# Patient Record
Sex: FEMALE | Race: WHITE | Employment: FULL TIME | URBAN - METROPOLITAN AREA
[De-identification: names, ages, dates, MRNs, and addresses within clinical notes are randomized per-mention and may not be internally consistent; named-entity substitution may affect disease eponyms.]

---

## 2017-01-09 ENCOUNTER — OFFICE VISIT (OUTPATIENT)
Dept: FAMILY MEDICINE CLINIC | Facility: CLINIC | Age: 22
End: 2017-01-09

## 2017-01-09 VITALS
RESPIRATION RATE: 18 BRPM | OXYGEN SATURATION: 99 % | HEART RATE: 128 BPM | BODY MASS INDEX: 49 KG/M2 | WEIGHT: 293 LBS | TEMPERATURE: 99 F | SYSTOLIC BLOOD PRESSURE: 130 MMHG | DIASTOLIC BLOOD PRESSURE: 88 MMHG

## 2017-01-09 DIAGNOSIS — J20.9 BRONCHOSPASM WITH BRONCHITIS, ACUTE: Primary | ICD-10-CM

## 2017-01-09 PROCEDURE — 94640 AIRWAY INHALATION TREATMENT: CPT | Performed by: PHYSICIAN ASSISTANT

## 2017-01-09 PROCEDURE — 99213 OFFICE O/P EST LOW 20 MIN: CPT | Performed by: PHYSICIAN ASSISTANT

## 2017-01-09 RX ORDER — BENZONATATE 200 MG/1
200 CAPSULE ORAL 3 TIMES DAILY PRN
Qty: 30 CAPSULE | Refills: 0 | Status: SHIPPED | OUTPATIENT
Start: 2017-01-09 | End: 2017-02-06 | Stop reason: ALTCHOICE

## 2017-01-09 RX ORDER — PREDNISONE 10 MG/1
50 TABLET ORAL DAILY
Qty: 25 TABLET | Refills: 0 | Status: SHIPPED | OUTPATIENT
Start: 2017-01-09 | End: 2017-01-19

## 2017-01-09 RX ORDER — MEDROXYPROGESTERONE ACETATE 150 MG/ML
150 INJECTION, SUSPENSION INTRAMUSCULAR
COMMUNITY
Start: 2016-02-19 | End: 2016-05-19

## 2017-01-09 RX ORDER — AZITHROMYCIN 250 MG/1
TABLET, FILM COATED ORAL
Qty: 6 TABLET | Refills: 0 | Status: SHIPPED | OUTPATIENT
Start: 2017-01-09 | End: 2017-01-14

## 2017-01-09 RX ORDER — ALBUTEROL SULFATE 90 UG/1
2 AEROSOL, METERED RESPIRATORY (INHALATION) EVERY 4 HOURS PRN
Qty: 1 INHALER | Refills: 0 | Status: SHIPPED | OUTPATIENT
Start: 2017-01-09 | End: 2017-12-22 | Stop reason: ALTCHOICE

## 2017-01-09 RX ORDER — IPRATROPIUM BROMIDE AND ALBUTEROL SULFATE 2.5; .5 MG/3ML; MG/3ML
3 SOLUTION RESPIRATORY (INHALATION) ONCE
Status: COMPLETED | OUTPATIENT
Start: 2017-01-09 | End: 2017-01-09

## 2017-01-09 RX ADMIN — IPRATROPIUM BROMIDE AND ALBUTEROL SULFATE 3 ML: 2.5; .5 SOLUTION RESPIRATORY (INHALATION) at 17:39:00

## 2017-01-09 NOTE — PATIENT INSTRUCTIONS
1. Duoneb today in office. 2.  Zithromax Z-Fred as prescribed. You will take this for 5 days and it stays in the system for 10 days. 3.  Prednisone 10 m tablets (50mg) once daily for 5 days.    4.  Albuterol inhaler:  2 puffs inhaled at least 3 ti

## 2017-01-09 NOTE — PROGRESS NOTES
CHIEF COMPLAINT:   Patient presents with:  Cough/URI: cough x 2 weeks. Alternating NP and productive with foul tasting sputum, sore throat from coughing, R ear pain, chest tightness and SOB/GARDINER.           HPI:   Diclia Kimball is a 19year old female who p double vision. HENT: Reports mild sinus congestion. CARDIOVASCULAR: Denies chest pain or palpitations  LUNGS: Per HPI. GI: Denies N/V/C/D or abdominal pain.       EXAM:   /88 mmHg  Pulse 128  Temp(Src) 98.5 °F (36.9 °C) (Oral)  Resp 18  Wt 316 lb hours as needed for Wheezing or Shortness of Breath. benzonatate 200 MG Oral Cap 30 capsule 0      Sig: Take 1 capsule (200 mg total) by mouth 3 (three) times daily as needed for cough.            Side effects, risks, benefits, of medication explained

## 2017-01-30 ENCOUNTER — OFFICE VISIT (OUTPATIENT)
Dept: FAMILY MEDICINE CLINIC | Facility: CLINIC | Age: 22
End: 2017-01-30

## 2017-01-30 VITALS
TEMPERATURE: 98 F | HEART RATE: 98 BPM | DIASTOLIC BLOOD PRESSURE: 82 MMHG | OXYGEN SATURATION: 99 % | SYSTOLIC BLOOD PRESSURE: 146 MMHG | RESPIRATION RATE: 18 BRPM

## 2017-01-30 DIAGNOSIS — Z23 NEED FOR VACCINATION: ICD-10-CM

## 2017-01-30 DIAGNOSIS — R03.0 ELEVATED BLOOD-PRESSURE READING WITHOUT DIAGNOSIS OF HYPERTENSION: ICD-10-CM

## 2017-01-30 DIAGNOSIS — R05.9 COUGH: Primary | ICD-10-CM

## 2017-01-30 PROCEDURE — 90471 IMMUNIZATION ADMIN: CPT | Performed by: PHYSICIAN ASSISTANT

## 2017-01-30 PROCEDURE — 90715 TDAP VACCINE 7 YRS/> IM: CPT | Performed by: PHYSICIAN ASSISTANT

## 2017-01-30 PROCEDURE — 99213 OFFICE O/P EST LOW 20 MIN: CPT | Performed by: PHYSICIAN ASSISTANT

## 2017-01-30 RX ORDER — FLUTICASONE PROPIONATE 50 MCG
2 SPRAY, SUSPENSION (ML) NASAL DAILY
Qty: 1 BOTTLE | Refills: 0 | Status: SHIPPED | OUTPATIENT
Start: 2017-01-30 | End: 2017-02-06

## 2017-01-30 RX ORDER — BENZONATATE 200 MG/1
200 CAPSULE ORAL 3 TIMES DAILY PRN
Qty: 30 CAPSULE | Refills: 0 | Status: SHIPPED | OUTPATIENT
Start: 2017-01-30 | End: 2017-02-06

## 2017-01-30 NOTE — PROGRESS NOTES
CHIEF COMPLAINT:   Patient presents with:  Cough      HPI:   Dilcia Kimball is a 19year old female who presents for persistent cough x 2+ weeks.   Reports continues to have predominately NP cough, post nasal drainage and episodic SOB while at rest.  Denies Smoking Status: Never Smoker                      Smokeless Status: Never Used                        Alcohol Use: Yes           0.0 oz/week       0 Standard drinks or equivalent per week       Comment: occasionally        REVIEW OF SYSTEMS:   GENERAL: n 1.  ?Cough--post viral cough vs secondary to post nasal drainage or Asthma variant. Coupon provided for albuterol respiclick. Flonase per epic for management of post nasal drainage, advised close f/u with PCP for recheck and further evaluation for ICS. Everyone has had a cough as part of the common cold, flu, or bronchitis. This kind of cough occurs along with an achy feeling, low-grade fever, nasal and sinus congestion, and a scratchy or sore throat. This usually gets better in 2 to 3 weeks.  A cough lane Cough may be the only sign of mild asthma. You may have tests to find out if asthma is causing your cough. You may also take asthma medicine on a trial basis.   Acid reflux (heartburn, GERD)  The esophagus is the tube that carries food from the mouth to the © 9179-1484 The [address] [place] [address], [address] [place]. All rights reserved. This information is not intended as a substitute for professional medical care. Always follow your healthcare professional's instructions.             The

## 2017-01-30 NOTE — PATIENT INSTRUCTIONS
1.  Tessalon perles as prescribed. 2.  Flonase as prescribed. 2 sprays in each nostril daily, or 1 spray in each nostril twice daily.   If you develop a nosebleed, stop medication and restart at half the dose (1 spray in each nostril daily) after you ha A cough that is worse at night may be due to postnasal drip. Excess mucus in the nose drains from the back of your nose to your throat. This triggers the cough reflex. Postnasal drip may be due to a sinus infection or allergy.  Common allergens include dust The esophagus is the tube that carries food from the mouth to the stomach. A valve at its lower end prevents stomach acids from flowing upward. If this valve does not work properly, acid from the stomach enters the esophagus.  This may cause a burning pain

## 2017-02-02 NOTE — TELEPHONE ENCOUNTER
Patient notified and verbalized understanding.     Future Appointments  Date Time Provider Fahad Thomason   2/6/2017 1:00 PM Simin Patel [facility] ANTONY Fong

## 2017-02-06 ENCOUNTER — OFFICE VISIT (OUTPATIENT)
Dept: FAMILY MEDICINE CLINIC | Facility: CLINIC | Age: 22
End: 2017-02-06

## 2017-02-06 VITALS
OXYGEN SATURATION: 98 % | BODY MASS INDEX: 45.99 KG/M2 | RESPIRATION RATE: 16 BRPM | HEIGHT: 67 IN | DIASTOLIC BLOOD PRESSURE: 84 MMHG | TEMPERATURE: 98 F | WEIGHT: 293 LBS | HEART RATE: 90 BPM | SYSTOLIC BLOOD PRESSURE: 124 MMHG

## 2017-02-06 DIAGNOSIS — Z00.00 PREVENTATIVE HEALTH CARE: Primary | ICD-10-CM

## 2017-02-06 DIAGNOSIS — Z00.00 HEALTHY ADULT ON ROUTINE PHYSICAL EXAMINATION: ICD-10-CM

## 2017-02-06 DIAGNOSIS — E66.01 MORBID OBESITY DUE TO EXCESS CALORIES (HCC): ICD-10-CM

## 2017-02-06 DIAGNOSIS — R05.9 COUGH: ICD-10-CM

## 2017-02-06 DIAGNOSIS — E78.00 PURE HYPERCHOLESTEROLEMIA: ICD-10-CM

## 2017-02-06 LAB
BASOPHILS # BLD AUTO: 0.04 X10(3) UL (ref 0–0.1)
BASOPHILS NFR BLD AUTO: 0.6 %
EOSINOPHIL # BLD AUTO: 0.19 X10(3) UL (ref 0–0.3)
EOSINOPHIL NFR BLD AUTO: 2.7 %
ERYTHROCYTE [DISTWIDTH] IN BLOOD BY AUTOMATED COUNT: 13.5 % (ref 11.5–16)
HCT VFR BLD AUTO: 41 % (ref 34–50)
HGB BLD-MCNC: 13.9 G/DL (ref 12–16)
IMMATURE GRANULOCYTE COUNT: 0.01 X10(3) UL (ref 0–1)
IMMATURE GRANULOCYTE RATIO %: 0.1 %
LYMPHOCYTES # BLD AUTO: 2.81 X10(3) UL (ref 0.9–4)
LYMPHOCYTES NFR BLD AUTO: 39.9 %
MCH RBC QN AUTO: 28 PG (ref 27–33.2)
MCHC RBC AUTO-ENTMCNC: 33.9 G/DL (ref 31–37)
MCV RBC AUTO: 82.5 FL (ref 81–100)
MONOCYTES # BLD AUTO: 0.58 X10(3) UL (ref 0.1–0.6)
MONOCYTES NFR BLD AUTO: 8.2 %
NEUTROPHIL ABS PRELIM: 3.41 X10 (3) UL (ref 1.3–6.7)
NEUTROPHILS # BLD AUTO: 3.41 X10(3) UL (ref 1.3–6.7)
NEUTROPHILS NFR BLD AUTO: 48.5 %
PLATELET # BLD AUTO: 279 10(3)UL (ref 150–450)
RBC # BLD AUTO: 4.97 X10(6)UL (ref 3.8–5.1)
RED CELL DISTRIBUTION WIDTH-SD: 40.5 FL (ref 35.1–46.3)
WBC # BLD AUTO: 7 X10(3) UL (ref 4–13)

## 2017-02-06 PROCEDURE — 80053 COMPREHEN METABOLIC PANEL: CPT | Performed by: FAMILY MEDICINE

## 2017-02-06 PROCEDURE — 85025 COMPLETE CBC W/AUTO DIFF WBC: CPT | Performed by: FAMILY MEDICINE

## 2017-02-06 PROCEDURE — 99395 PREV VISIT EST AGE 18-39: CPT | Performed by: FAMILY MEDICINE

## 2017-02-06 PROCEDURE — 36415 COLL VENOUS BLD VENIPUNCTURE: CPT | Performed by: FAMILY MEDICINE

## 2017-02-06 PROCEDURE — 80061 LIPID PANEL: CPT | Performed by: FAMILY MEDICINE

## 2017-02-06 RX ORDER — FLUTICASONE PROPIONATE 50 MCG
2 SPRAY, SUSPENSION (ML) NASAL DAILY
Qty: 1 BOTTLE | Refills: 0 | Status: SHIPPED | OUTPATIENT
Start: 2017-02-06 | End: 2017-12-22 | Stop reason: ALTCHOICE

## 2017-02-06 RX ORDER — BENZONATATE 200 MG/1
200 CAPSULE ORAL 3 TIMES DAILY PRN
Qty: 30 CAPSULE | Refills: 0 | Status: SHIPPED | OUTPATIENT
Start: 2017-02-06 | End: 2017-12-22 | Stop reason: ALTCHOICE

## 2017-02-06 NOTE — PROGRESS NOTES
HPI:   Manjinder Gibson is a 19year old female who presents for a complete physical exam. Symptoms: not having periods. Patient complains of nothing today. Recovering from bronchitis. Started on inhalers in [facility], but could not afford them.  Cough is getti MCG/ACT Inhalation Aero Soln Inhale 2 puffs into the lungs every 4 (four) hours as needed for Wheezing or Shortness of Breath.  Disp: 1 Inhaler Rfl: 0   benzonatate 200 MG Oral Cap Take 1 capsule (200 mg total) by mouth 3 (three) times daily as needed for c throat are clear  EYES:PERRLA, EOMI, conjunctiva are clear  NECK: supple,no adenopathy   CHEST: no chest tenderness  BREAST: no dominant or suspicious mass  LUNGS: clear to auscultation  CARDIO: RRR without murmur  GI: good BS's,no masses, HSM or tendernes

## 2017-02-07 ENCOUNTER — TELEPHONE (OUTPATIENT)
Dept: FAMILY MEDICINE CLINIC | Facility: CLINIC | Age: 22
End: 2017-02-07

## 2017-02-07 DIAGNOSIS — E78.00 PURE HYPERCHOLESTEROLEMIA: Primary | ICD-10-CM

## 2017-02-07 LAB
ALBUMIN SERPL-MCNC: 4 G/DL (ref 3.5–4.8)
ALP LIVER SERPL-CCNC: 74 U/L (ref 52–144)
ALT SERPL-CCNC: 27 U/L (ref 14–54)
AST SERPL-CCNC: 17 U/L (ref 15–41)
BILIRUB SERPL-MCNC: 0.3 MG/DL (ref 0.1–2)
BUN BLD-MCNC: 16 MG/DL (ref 8–20)
CALCIUM BLD-MCNC: 9.2 MG/DL (ref 8.3–10.3)
CHLORIDE: 105 MMOL/L (ref 101–111)
CHOLEST SMN-MCNC: 257 MG/DL (ref ?–190)
CO2: 27 MMOL/L (ref 22–32)
CREAT BLD-MCNC: 0.91 MG/DL (ref 0.55–1.02)
GLUCOSE BLD-MCNC: 51 MG/DL (ref 70–99)
HDLC SERPL-MCNC: 50 MG/DL (ref 45–?)
HDLC SERPL: 5.14 {RATIO} (ref ?–4.44)
LDLC SERPL CALC-MCNC: 189 MG/DL (ref ?–120)
M PROTEIN MFR SERPL ELPH: 8 G/DL (ref 6.1–8.3)
NONHDLC SERPL-MCNC: 207 MG/DL (ref ?–150)
POTASSIUM SERPL-SCNC: 3.8 MMOL/L (ref 3.6–5.1)
SODIUM SERPL-SCNC: 140 MMOL/L (ref 136–144)
TRIGLYCERIDES: 92 MG/DL (ref ?–115)
VLDL: 18 MG/DL (ref 5–40)

## 2017-02-07 RX ORDER — ATORVASTATIN CALCIUM 10 MG/1
10 TABLET, FILM COATED ORAL NIGHTLY
Qty: 90 TABLET | Refills: 0 | Status: SHIPPED | OUTPATIENT
Start: 2017-02-07 | End: 2017-12-22 | Stop reason: ALTCHOICE

## 2017-02-07 NOTE — TELEPHONE ENCOUNTER
D/w pt results of recent blood work. Started a cholesterol medication today. Script sent. Discussed possible side effects. Please place recall for lipids in 3-4 months.

## 2017-03-17 DIAGNOSIS — R05.9 COUGH: Primary | ICD-10-CM

## 2017-03-17 NOTE — TELEPHONE ENCOUNTER
Script printed. I'd like her to come in for a wt check, when she pick it up, please. Check wt, bp, pulse for RN visit. Thanks.

## 2017-03-17 NOTE — TELEPHONE ENCOUNTER
Patient states her schedule is crazy busy and she is not sure if she can get to the office for a nurse visit. Patient states she was told she did not need to come back for 2 months and wants to know if ok to wait.

## 2017-03-17 NOTE — TELEPHONE ENCOUNTER
Patient notified and verbalized understanding.   States she will call back to schedule appt with Dr Sindhu Garcia prior to next refill

## 2017-03-17 NOTE — TELEPHONE ENCOUNTER
Last OV 2/6/17  Last refilled: 2/6/17 Phentermine #30  0 refills         2/7/17 Simvastatin  #90  0 refills,  should not need refill

## 2017-03-29 ENCOUNTER — OFFICE VISIT (OUTPATIENT)
Dept: FAMILY MEDICINE CLINIC | Facility: CLINIC | Age: 22
End: 2017-03-29

## 2017-03-29 VITALS
TEMPERATURE: 98 F | OXYGEN SATURATION: 99 % | SYSTOLIC BLOOD PRESSURE: 140 MMHG | RESPIRATION RATE: 16 BRPM | HEART RATE: 80 BPM | DIASTOLIC BLOOD PRESSURE: 70 MMHG

## 2017-03-29 DIAGNOSIS — J01.20 ACUTE ETHMOIDAL SINUSITIS, RECURRENCE NOT SPECIFIED: Primary | ICD-10-CM

## 2017-03-29 DIAGNOSIS — M26.609 TMJ (TEMPOROMANDIBULAR JOINT DISORDER): ICD-10-CM

## 2017-03-29 DIAGNOSIS — R03.0 ELEVATED BLOOD PRESSURE READING WITHOUT DIAGNOSIS OF HYPERTENSION: ICD-10-CM

## 2017-03-29 DIAGNOSIS — E66.01 MORBID OBESITY DUE TO EXCESS CALORIES (HCC): ICD-10-CM

## 2017-03-29 DIAGNOSIS — H92.03 OTALGIA, BILATERAL: ICD-10-CM

## 2017-03-29 DIAGNOSIS — J30.9 ALLERGIC RHINITIS, UNSPECIFIED ALLERGIC RHINITIS TRIGGER, UNSPECIFIED RHINITIS SEASONALITY: ICD-10-CM

## 2017-03-29 PROCEDURE — 99213 OFFICE O/P EST LOW 20 MIN: CPT | Performed by: PHYSICIAN ASSISTANT

## 2017-03-29 RX ORDER — FLUTICASONE PROPIONATE 50 MCG
2 SPRAY, SUSPENSION (ML) NASAL DAILY
Qty: 1 BOTTLE | Refills: 0 | Status: SHIPPED | OUTPATIENT
Start: 2017-03-29 | End: 2017-12-22 | Stop reason: ALTCHOICE

## 2017-03-29 RX ORDER — AMOXICILLIN AND CLAVULANATE POTASSIUM 875; 125 MG/1; MG/1
1 TABLET, FILM COATED ORAL 2 TIMES DAILY
Qty: 20 TABLET | Refills: 0 | Status: SHIPPED | OUTPATIENT
Start: 2017-03-29 | End: 2017-04-08

## 2017-03-29 NOTE — PATIENT INSTRUCTIONS
1. Augmentin 875mg twice daily for 10 days. Recommend probiotics while on this medication to prevent antibiotics associated diarrhea or yeast infections.   Examples include yogurt with active live cultures; or in capsule/granule forms such as Florastor, C Applying heat to the area surrounding your sinuses may make you feel more comfortable. Use a hot water bottle or a hand towel dipped in hot water. Some people also find ice packs effective for relieving pain.   Medicines  Your doctor may prescribe medicatio If you answer “yes” to any of the questions above, you need to take action. Adjusting your posture or taking a short break can help prevent or relieve TMD symptoms. Listening to your body  Many people get used to ignoring pain.  But pain is a signal that y © 9685-8167 [address], [address] [place]. All rights reserved. This information is not intended as a substitute for professional medical care. Always follow your healthcare professional's instructions.

## 2017-03-29 NOTE — PROGRESS NOTES
CHIEF COMPLAINT:   Patient presents with:  Ear Pain      HPI:   Hemant Garcia is a 19year old female who presents to clinic today with complaints of left ear pain. Has had for 1  days. Pain is described as deep/aching.   Seen by chiropractor who also ev Diagnosis Date   • Obesity    • Depression    • Anxiety    • Calculus of kidney       Social History:    Smoking Status: Never Smoker                      Smokeless Status: Never Used                        Alcohol Use: Yes           0.0 oz/week       0 St 1 . Augmentin per epic for suspected ethmoid sinusitis. Advised pt suspect underlying sinusitis. Recommend daily antihistamine and Flonase as directed, given failure of pharmacy to fill previous Rx via escript, will issue written rx for Flonase.   Use and 1. Augmentin 875mg twice daily for 10 days. Recommend probiotics while on this medication to prevent antibiotics associated diarrhea or yeast infections.   Examples include yogurt with active live cultures; or in capsule/granule forms such as Florastor, C Applying heat to the area surrounding your sinuses may make you feel more comfortable. Use a hot water bottle or a hand towel dipped in hot water. Some people also find ice packs effective for relieving pain.   Medicines  Your doctor may prescribe medicatio If you answer “yes” to any of the questions above, you need to take action. Adjusting your posture or taking a short break can help prevent or relieve TMD symptoms. Listening to your body  Many people get used to ignoring pain.  But pain is a signal that y © 4563-6044 [address], [address] [place]. All rights reserved. This information is not intended as a substitute for professional medical care. Always follow your healthcare professional's instructions.

## 2017-05-01 ENCOUNTER — TELEPHONE (OUTPATIENT)
Dept: FAMILY MEDICINE CLINIC | Facility: CLINIC | Age: 22
End: 2017-05-01

## 2017-05-01 NOTE — TELEPHONE ENCOUNTER
Patient states she is changing a super tampon about every hour. Is leaking through in between. States she is on her third change since 7:30. Period started Saturday as spotting, Sunday was her normal heavy flow but today it is worse. No dizziness.

## 2017-05-01 NOTE — TELEPHONE ENCOUNTER
It can be normal to have changes in periods or heavier periods after stopping the depo shots. If the bleeding does not slow down in the next 24 hours then we may need to restart some hormones to get you under control. Let me know.  Otherwise, if you are fee

## 2017-05-02 ENCOUNTER — TELEPHONE (OUTPATIENT)
Dept: FAMILY MEDICINE CLINIC | Facility: CLINIC | Age: 22
End: 2017-05-02

## 2017-05-08 ENCOUNTER — PATIENT OUTREACH (OUTPATIENT)
Dept: FAMILY MEDICINE CLINIC | Facility: CLINIC | Age: 22
End: 2017-05-08

## 2017-06-01 ENCOUNTER — OFFICE VISIT (OUTPATIENT)
Dept: FAMILY MEDICINE CLINIC | Facility: CLINIC | Age: 22
End: 2017-06-01

## 2017-06-01 VITALS
BODY MASS INDEX: 50 KG/M2 | WEIGHT: 293 LBS | RESPIRATION RATE: 12 BRPM | TEMPERATURE: 99 F | HEART RATE: 100 BPM | DIASTOLIC BLOOD PRESSURE: 78 MMHG | SYSTOLIC BLOOD PRESSURE: 112 MMHG

## 2017-06-01 DIAGNOSIS — Z31.69 PRE-CONCEPTION COUNSELING: Primary | ICD-10-CM

## 2017-06-01 DIAGNOSIS — Z91.09 ENVIRONMENTAL ALLERGIES: ICD-10-CM

## 2017-06-01 DIAGNOSIS — L21.9 SEBORRHEIC DERMATITIS OF SCALP: ICD-10-CM

## 2017-06-01 DIAGNOSIS — R73.03 PREDIABETES: ICD-10-CM

## 2017-06-01 DIAGNOSIS — R23.8 DRY SCALP: ICD-10-CM

## 2017-06-01 PROCEDURE — 99214 OFFICE O/P EST MOD 30 MIN: CPT | Performed by: FAMILY MEDICINE

## 2017-06-01 RX ORDER — KETOCONAZOLE 20 MG/ML
SHAMPOO TOPICAL
Qty: 1 BOTTLE | Refills: 0 | Status: SHIPPED | OUTPATIENT
Start: 2017-06-01 | End: 2017-12-22 | Stop reason: ALTCHOICE

## 2017-06-01 RX ORDER — METFORMIN HYDROCHLORIDE 500 MG/1
500 TABLET, EXTENDED RELEASE ORAL DAILY
Qty: 30 TABLET | Refills: 0 | Status: SHIPPED | OUTPATIENT
Start: 2017-06-01 | End: 2017-08-09

## 2017-06-01 NOTE — PROGRESS NOTES
Yesenia Wells is a 19year old female.   Patient presents with:  Fertility: per pt, infertility issues; has been trying to conceive for a few months  Refill Request: needs refill of flonase; having bad allergies    Wt: was on phentermin for a while, bu Oral Tablet 24 Hr Take 1 tablet (500 mg total) by mouth daily.  Disp: 30 tablet Rfl: 0   Ketoconazole 2 % External Shampoo 1 application twice weekly, leave in for 5 min, then rinse out Disp: 1 Bottle Rfl: 0   Fluticasone Propionate 50 MCG/ACT Nasal Suspens abdominal pain and denies heartburn  NEURO: denies headaches    EXAM:   /78 mmHg  Pulse 100  Temp(Src) 99 °F (37.2 °C) (Temporal)  Resp 12  Wt 321 lb 12.8 oz  LMP 05/27/2017 Body mass index is 50.39 kg/(m^2).       GENERAL: well developed, well nouris

## 2017-06-06 ENCOUNTER — TELEPHONE (OUTPATIENT)
Dept: FAMILY MEDICINE CLINIC | Facility: CLINIC | Age: 22
End: 2017-06-06

## 2017-07-11 ENCOUNTER — PATIENT OUTREACH (OUTPATIENT)
Dept: FAMILY MEDICINE CLINIC | Facility: CLINIC | Age: 22
End: 2017-07-11

## 2017-07-11 NOTE — PROGRESS NOTES
Detailed message left for patient (consent in Media Tab) notifying of overdue labs. Will postpone for one month.

## 2017-08-09 ENCOUNTER — TELEPHONE (OUTPATIENT)
Dept: FAMILY MEDICINE CLINIC | Facility: CLINIC | Age: 22
End: 2017-08-09

## 2017-08-09 DIAGNOSIS — E66.01 MORBID OBESITY DUE TO EXCESS CALORIES (HCC): ICD-10-CM

## 2017-08-09 RX ORDER — METFORMIN HYDROCHLORIDE 500 MG/1
500 TABLET, EXTENDED RELEASE ORAL DAILY
Qty: 30 TABLET | Refills: 0 | Status: SHIPPED | OUTPATIENT
Start: 2017-08-09 | End: 2017-12-22 | Stop reason: ALTCHOICE

## 2017-08-09 NOTE — TELEPHONE ENCOUNTER
Patient's last HgbA1c 1 15 2016 -5.7- Patient thinks that she should have an increase in meds?  Should she have a repeat HgbA1c first ?  Glucose on CMP on 2 6 2017 - 51

## 2017-08-09 NOTE — TELEPHONE ENCOUNTER
Metformin refilled as is. Have her schedule a f/u appt wth Dr. Jaylon Villafana in a month or so to discuss if the med dose needs to be increased.   Thanks

## 2017-08-09 NOTE — TELEPHONE ENCOUNTER
PT REQUESTING REFILL OFMetFORMIN HCl  MG Oral Tablet 24 Hr  PT WAS WONDERING IF SHE SHOULD HAVE AN INCREASE IN MEDS.     IF SCRIPT SENT PLEASE SEND TO  [facility]    LM IN PT DOES NOT ANSWER, SHE IS AT WORK AND CAN NOT ALWAYS ANSWER P

## 2017-08-11 ENCOUNTER — TELEPHONE (OUTPATIENT)
Dept: FAMILY MEDICINE CLINIC | Facility: CLINIC | Age: 22
End: 2017-08-11

## 2017-08-11 NOTE — TELEPHONE ENCOUNTER
Patient is due for lipid. Will mail letter to patients home address. Numerous attempts have been made to contact patient to schedule an appt.

## 2017-12-22 ENCOUNTER — OFFICE VISIT (OUTPATIENT)
Dept: FAMILY MEDICINE CLINIC | Facility: CLINIC | Age: 22
End: 2017-12-22

## 2017-12-22 VITALS
SYSTOLIC BLOOD PRESSURE: 126 MMHG | DIASTOLIC BLOOD PRESSURE: 76 MMHG | TEMPERATURE: 98 F | RESPIRATION RATE: 14 BRPM | OXYGEN SATURATION: 99 %

## 2017-12-22 DIAGNOSIS — J02.9 SORE THROAT: Primary | ICD-10-CM

## 2017-12-22 DIAGNOSIS — R05.9 COUGH: ICD-10-CM

## 2017-12-22 DIAGNOSIS — R09.81 NASAL CONGESTION: ICD-10-CM

## 2017-12-22 PROCEDURE — 87880 STREP A ASSAY W/OPTIC: CPT | Performed by: FAMILY MEDICINE

## 2017-12-22 PROCEDURE — 99213 OFFICE O/P EST LOW 20 MIN: CPT | Performed by: FAMILY MEDICINE

## 2017-12-22 NOTE — PROGRESS NOTES
HPI:    Patient ID: Kulwant Anand is a 23year old female. Patient presents with:  Cough  Chest Congestion      HPI  Patient is here for cough, sore throat and nasal congestion for 4 days. States cough is productive of greenish sputum.   Has ear pr Ear: Tympanic membrane, external ear and ear canal normal.   Nose: Right sinus exhibits no maxillary sinus tenderness and no frontal sinus tenderness. Left sinus exhibits no maxillary sinus tenderness and no frontal sinus tenderness.    Mouth/Throat: Poster

## 2017-12-26 ENCOUNTER — TELEPHONE (OUTPATIENT)
Dept: FAMILY MEDICINE CLINIC | Facility: CLINIC | Age: 22
End: 2017-12-26

## 2017-12-26 RX ORDER — AMOXICILLIN 875 MG/1
875 TABLET, COATED ORAL 2 TIMES DAILY
Qty: 20 TABLET | Refills: 0 | Status: SHIPPED | OUTPATIENT
Start: 2017-12-26 | End: 2018-01-05

## 2017-12-26 NOTE — TELEPHONE ENCOUNTER
Lab follow up    Spoke with rosanne salguero, specimen is not in lab. Directed to send out ([phone]), specimen is not in the lab and was not received. Will call pt to follow up.     Spoke to pt, she started taking Amoxil 500mg TID that she had left over

## 2017-12-31 ENCOUNTER — NURSE ONLY (OUTPATIENT)
Dept: FAMILY MEDICINE CLINIC | Facility: CLINIC | Age: 22
End: 2017-12-31

## 2017-12-31 VITALS
SYSTOLIC BLOOD PRESSURE: 120 MMHG | HEART RATE: 120 BPM | RESPIRATION RATE: 20 BRPM | DIASTOLIC BLOOD PRESSURE: 60 MMHG | OXYGEN SATURATION: 98 % | TEMPERATURE: 98 F

## 2017-12-31 DIAGNOSIS — H66.91 ACUTE BACTERIAL INFECTION OF RIGHT MIDDLE EAR: Primary | ICD-10-CM

## 2017-12-31 PROCEDURE — 99213 OFFICE O/P EST LOW 20 MIN: CPT | Performed by: NURSE PRACTITIONER

## 2017-12-31 RX ORDER — AMOXICILLIN AND CLAVULANATE POTASSIUM 875; 125 MG/1; MG/1
1 TABLET, FILM COATED ORAL 2 TIMES DAILY
Qty: 20 TABLET | Refills: 0 | Status: SHIPPED | OUTPATIENT
Start: 2017-12-31 | End: 2018-01-10

## 2017-12-31 NOTE — PROGRESS NOTES
CHIEF COMPLAINT:   Patient presents with:  Ear Pain      HPI:   Angela Mccormick is a 23year old female who presents to clinic today with complaints of right ear pain. Has had for 3  days. Pain is described as constant and a clogging sensation.   Cricket NOSE: nostrils patent, yellow nasal mucus, nasal mucosa red and swollen  THROAT: oral mucosa pink, moist. Posterior pharynx not erythematous or injected. No exudates.   NECK: supple, non-tender  LUNGS: clear to auscultation bilaterally, no wheezes or rhonch Sound waves may be disrupted before they reach the inner ear. If this happens, conductive hearing loss may occur. The ear canal can be blocked by wax, infection, a tumor, or a foreign object. The eardrum can be injured or infected.  Abnormal bone growth, in

## 2017-12-31 NOTE — PATIENT INSTRUCTIONS
Stop the Amoxicillin and start the Augmentin      Common Middle Ear Problems    Your middle ear may have been injured or infected recently. Over time, certain growths or bone disease can also harm the middle ear.  Left untreated, middle ear problems often l

## 2018-01-22 ENCOUNTER — OFFICE VISIT (OUTPATIENT)
Dept: FAMILY MEDICINE CLINIC | Facility: CLINIC | Age: 23
End: 2018-01-22

## 2018-01-22 VITALS
HEART RATE: 96 BPM | SYSTOLIC BLOOD PRESSURE: 118 MMHG | RESPIRATION RATE: 16 BRPM | TEMPERATURE: 98 F | HEIGHT: 65 IN | DIASTOLIC BLOOD PRESSURE: 72 MMHG | WEIGHT: 293 LBS | BODY MASS INDEX: 48.82 KG/M2

## 2018-01-22 DIAGNOSIS — B37.3 VAGINAL CANDIDIASIS: Primary | ICD-10-CM

## 2018-01-22 DIAGNOSIS — O99.891 NASAL CONGESTION RELATED TO PREGNANCY: ICD-10-CM

## 2018-01-22 DIAGNOSIS — R09.81 NASAL CONGESTION RELATED TO PREGNANCY: ICD-10-CM

## 2018-01-22 PROCEDURE — 99214 OFFICE O/P EST MOD 30 MIN: CPT | Performed by: FAMILY MEDICINE

## 2018-01-22 RX ORDER — CLOTRIMAZOLE 1 %
1 CREAM WITH APPLICATOR VAGINAL DAILY
Qty: 7 TUBE | Refills: 0 | Status: SHIPPED | OUTPATIENT
Start: 2018-01-22 | End: 2018-01-29

## 2018-01-22 RX ORDER — ONDANSETRON 4 MG/1
TABLET, ORALLY DISINTEGRATING ORAL AS NEEDED
Refills: 0 | COMMUNITY
Start: 2018-01-15 | End: 2018-11-06 | Stop reason: ALTCHOICE

## 2018-01-23 NOTE — PROGRESS NOTES
Chun Arambula is a 23year old female.   Patient presents with:  Vaginal Problem: per pt, possible yeast infection x1 week; seems to be getting worse; has tried OTC monistat; recently on antibiotics  Cough: cough since November      HPI:   Thought she of breath with exertion  CARDIOVASCULAR: denies chest pain on exertion  GI: denies abdominal pain and denies heartburn  : no vaginal bleeding, thinks she feels baby moving, no cramping or abd pain   NEURO: denies headaches    EXAM:   /72   Pulse 96

## 2018-11-06 ENCOUNTER — OFFICE VISIT (OUTPATIENT)
Dept: FAMILY MEDICINE CLINIC | Facility: CLINIC | Age: 23
End: 2018-11-06
Payer: COMMERCIAL

## 2018-11-06 VITALS
HEIGHT: 65 IN | BODY MASS INDEX: 48.82 KG/M2 | SYSTOLIC BLOOD PRESSURE: 124 MMHG | RESPIRATION RATE: 20 BRPM | DIASTOLIC BLOOD PRESSURE: 84 MMHG | HEART RATE: 99 BPM | WEIGHT: 293 LBS | OXYGEN SATURATION: 99 % | TEMPERATURE: 98 F

## 2018-11-06 DIAGNOSIS — M54.50 CHRONIC LEFT-SIDED LOW BACK PAIN WITHOUT SCIATICA: Primary | ICD-10-CM

## 2018-11-06 DIAGNOSIS — F98.8 ATTENTION DEFICIT DISORDER (ADD) WITHOUT HYPERACTIVITY: ICD-10-CM

## 2018-11-06 DIAGNOSIS — G89.29 CHRONIC LEFT-SIDED LOW BACK PAIN WITHOUT SCIATICA: Primary | ICD-10-CM

## 2018-11-06 DIAGNOSIS — R41.840 DIFFICULTY CONCENTRATING: ICD-10-CM

## 2018-11-06 PROCEDURE — 99214 OFFICE O/P EST MOD 30 MIN: CPT | Performed by: FAMILY MEDICINE

## 2018-11-06 RX ORDER — METHYLPREDNISOLONE 4 MG/1
TABLET ORAL
Qty: 1 KIT | Refills: 0 | Status: SHIPPED | OUTPATIENT
Start: 2018-11-06 | End: 2018-11-10

## 2018-11-06 RX ORDER — DEXTROAMPHETAMINE SACCHARATE, AMPHETAMINE ASPARTATE MONOHYDRATE, DEXTROAMPHETAMINE SULFATE AND AMPHETAMINE SULFATE 3.75; 3.75; 3.75; 3.75 MG/1; MG/1; MG/1; MG/1
15 CAPSULE, EXTENDED RELEASE ORAL DAILY
Qty: 30 CAPSULE | Refills: 0 | Status: SHIPPED | OUTPATIENT
Start: 2018-11-06 | End: 2018-12-06
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
## 2018-11-10 ENCOUNTER — OFFICE VISIT (OUTPATIENT)
Dept: FAMILY MEDICINE CLINIC | Facility: CLINIC | Age: 23
End: 2018-11-10
Payer: COMMERCIAL

## 2018-11-10 VITALS
WEIGHT: 293 LBS | SYSTOLIC BLOOD PRESSURE: 122 MMHG | BODY MASS INDEX: 48.82 KG/M2 | TEMPERATURE: 98 F | OXYGEN SATURATION: 98 % | DIASTOLIC BLOOD PRESSURE: 82 MMHG | RESPIRATION RATE: 18 BRPM | HEIGHT: 65 IN

## 2018-11-10 DIAGNOSIS — J40 BRONCHITIS: Primary | ICD-10-CM

## 2018-11-10 PROCEDURE — 99213 OFFICE O/P EST LOW 20 MIN: CPT | Performed by: NURSE PRACTITIONER

## 2018-11-10 RX ORDER — BENZONATATE 200 MG/1
200 CAPSULE ORAL 3 TIMES DAILY PRN
Qty: 30 CAPSULE | Refills: 0 | Status: SHIPPED | OUTPATIENT
Start: 2018-11-10 | End: 2019-01-07

## 2018-11-10 RX ORDER — PREDNISONE 20 MG/1
20 TABLET ORAL DAILY
Qty: 5 TABLET | Refills: 0 | Status: SHIPPED | OUTPATIENT
Start: 2018-11-10 | End: 2018-11-15

## 2018-11-10 NOTE — PROGRESS NOTES
CHIEF COMPLAINT:   Patient presents with:  Cough: x2 weeks, productive, bodyaches, fatigue         HPI:   Houston Menchaca is a 20year old female who presents for cough for  2  weeks. Cough started gradually and is described as horrible.  Patient denies HENT: Atraumatic, normocephalic. TM's clear bilaterally. Nostrils patent, nasal mucosa pink and non-inflamed. No erythema of the throat. NECK: supple, non-tender. LUNGS: Normal respiratory rate. Normal effort. dry cough. no wheezing.  No rales or crac Bronchitis that is caused by a virus is not treated with antibiotics. Instead, medicines may be given to help relieve symptoms. Symptoms can last up to 2 weeks, although the cough may last much longer.   This illness is contagious during the first few days If you are age 72 or older, or if you have a chronic lung disease or condition that affects your immune system, or you smoke, ask your healthcare provider about getting a pneumococcal vaccine and a yearly flu shot (influenza vaccine).   When to seek medical

## 2018-11-10 NOTE — PATIENT INSTRUCTIONS
Use the inhaler at home 2 puffs every 4 hours  Use the medications prescribed  Use Nyquil at bedtime      Viral Bronchitis (Adult)    You have a viral bronchitis. Bronchitis is inflammation and swelling of the lining of the lungs.  This is often caused by a soup). Extra fluids will help loosen secretions in the nose and lungs. · Over-the-counter cough, cold, and sore-throat medicines will not shorten the length of the illness, but they may help to reduce symptoms.  Don't use decongestants if you have high blo

## 2018-11-13 ENCOUNTER — TELEPHONE (OUTPATIENT)
Dept: FAMILY MEDICINE CLINIC | Facility: CLINIC | Age: 23
End: 2018-11-13

## 2018-11-13 DIAGNOSIS — R41.840 DIFFICULTY CONCENTRATING: ICD-10-CM

## 2018-11-13 DIAGNOSIS — F98.8 ATTENTION DEFICIT DISORDER (ADD) WITHOUT HYPERACTIVITY: ICD-10-CM

## 2018-11-13 NOTE — TELEPHONE ENCOUNTER
Spoke with Vanessa Carolina at [employer] and provided clinical information for PA. Approval received  Auth # [id number]  Valid 11/13/18-11/13/21    Jovany at [employer] notified. Per Gabby Dangelo script processed and he will ready for patient.

## 2018-12-10 NOTE — TELEPHONE ENCOUNTER
Patient came back to office with script given earlier today. Script printed has start date of 2/8/19.   Patient returned script   Needs script with current date

## 2018-12-10 NOTE — TELEPHONE ENCOUNTER
Dose increased to 25 mg, but she needs to see me in the office before any more refills, even if we leave the dose the same.

## 2018-12-10 NOTE — TELEPHONE ENCOUNTER
Amphetamine-Dextroamphet ER (ADDERALL XR) 15 MG Oral Capsule SR 24 Hr 30 capsule 0 11/6/2018 12/6/2018   Sig :  Take 1 capsule (15 mg total) by mouth daily.      Route:   Oral       Patient would like to up the dose, Feels like it is wearing off the last 2

## 2019-01-07 ENCOUNTER — OFFICE VISIT (OUTPATIENT)
Dept: FAMILY MEDICINE CLINIC | Facility: CLINIC | Age: 24
End: 2019-01-07
Payer: COMMERCIAL

## 2019-01-07 VITALS
RESPIRATION RATE: 20 BRPM | TEMPERATURE: 98 F | WEIGHT: 293 LBS | HEIGHT: 65 IN | DIASTOLIC BLOOD PRESSURE: 88 MMHG | HEART RATE: 90 BPM | BODY MASS INDEX: 48.82 KG/M2 | SYSTOLIC BLOOD PRESSURE: 126 MMHG

## 2019-01-07 DIAGNOSIS — J40 BRONCHITIS: Primary | ICD-10-CM

## 2019-01-07 DIAGNOSIS — F90.0 ATTENTION DEFICIT HYPERACTIVITY DISORDER (ADHD), PREDOMINANTLY INATTENTIVE TYPE: ICD-10-CM

## 2019-01-07 PROCEDURE — 99214 OFFICE O/P EST MOD 30 MIN: CPT | Performed by: FAMILY MEDICINE

## 2019-01-07 RX ORDER — DEXTROAMPHETAMINE SACCHARATE, AMPHETAMINE ASPARTATE MONOHYDRATE, DEXTROAMPHETAMINE SULFATE AND AMPHETAMINE SULFATE 6.25; 6.25; 6.25; 6.25 MG/1; MG/1; MG/1; MG/1
25 CAPSULE, EXTENDED RELEASE ORAL DAILY
Qty: 30 CAPSULE | Refills: 0 | Status: SHIPPED | OUTPATIENT
Start: 2019-01-07 | End: 2019-02-06

## 2019-01-07 RX ORDER — DEXTROAMPHETAMINE SACCHARATE, AMPHETAMINE ASPARTATE MONOHYDRATE, DEXTROAMPHETAMINE SULFATE AND AMPHETAMINE SULFATE 6.25; 6.25; 6.25; 6.25 MG/1; MG/1; MG/1; MG/1
25 CAPSULE, EXTENDED RELEASE ORAL DAILY
Qty: 30 CAPSULE | Refills: 0 | Status: SHIPPED | OUTPATIENT
Start: 2019-02-06 | End: 2019-03-08

## 2019-01-07 RX ORDER — BENZONATATE 200 MG/1
200 CAPSULE ORAL 3 TIMES DAILY PRN
Qty: 30 CAPSULE | Refills: 0 | Status: SHIPPED | OUTPATIENT
Start: 2019-01-07 | End: 2019-04-03 | Stop reason: ALTCHOICE

## 2019-01-07 RX ORDER — AZITHROMYCIN 250 MG/1
TABLET, FILM COATED ORAL
Qty: 6 TABLET | Refills: 0 | Status: SHIPPED | OUTPATIENT
Start: 2019-01-07 | End: 2019-04-03 | Stop reason: ALTCHOICE

## 2019-01-07 RX ORDER — DEXTROAMPHETAMINE SACCHARATE, AMPHETAMINE ASPARTATE MONOHYDRATE, DEXTROAMPHETAMINE SULFATE AND AMPHETAMINE SULFATE 6.25; 6.25; 6.25; 6.25 MG/1; MG/1; MG/1; MG/1
25 CAPSULE, EXTENDED RELEASE ORAL DAILY
Qty: 30 CAPSULE | Refills: 0 | Status: SHIPPED | OUTPATIENT
Start: 2019-03-08 | End: 2019-04-07

## 2019-01-07 NOTE — PROGRESS NOTES
Kulwant Anand is a 20year old female. Patient presents with: Follow - Up: medication refill on Adderall   Cough      HPI:   ADHD: doing well with medication. It is working well. She is happy with the dose she is on as well. Would like to continue. use: No      Alcohol/week: 0.0 oz      Comment: occasionally    Drug use: No       BP Readings from Last 6 Encounters:  01/07/19 : 126/88  11/10/18 : 122/82  11/06/18 : 124/84  01/22/18 : 118/72  12/31/17 : 120/60  12/22/17 : 126/76      Wt Readings from L throat.   - RTC if not getting better or if worsening in the next 1-2 weeks. Attention deficit hyperactivity disorder (ADHD), predominantly inattentive type  -     Amphetamine-Dextroamphet ER (ADDERALL XR) 25 MG Oral Capsule SR 24 Hr;  Take 1 capsule (2

## 2019-01-18 ENCOUNTER — PATIENT MESSAGE (OUTPATIENT)
Dept: FAMILY MEDICINE CLINIC | Facility: CLINIC | Age: 24
End: 2019-01-18

## 2019-01-18 DIAGNOSIS — H65.90 NON-SUPPURATIVE OTITIS MEDIA, UNSPECIFIED LATERALITY: Primary | ICD-10-CM

## 2019-01-21 RX ORDER — AMOXICILLIN AND CLAVULANATE POTASSIUM 875; 125 MG/1; MG/1
1 TABLET, FILM COATED ORAL 2 TIMES DAILY
Qty: 20 TABLET | Refills: 0 | Status: SHIPPED | OUTPATIENT
Start: 2019-01-21 | End: 2019-01-31

## 2019-01-21 NOTE — TELEPHONE ENCOUNTER
From: Ezekiel Goldmann  To: Karina Madrid DO  Sent: 1/18/2019 10:09 PM CST  Subject: Prescription Question    Hi dr Himanshu Jorge!  I finished the zpack that you gave me, but i still have this cough, and whatever this is feels like it settled into my ears bec

## 2019-04-03 ENCOUNTER — OFFICE VISIT (OUTPATIENT)
Dept: FAMILY MEDICINE CLINIC | Facility: CLINIC | Age: 24
End: 2019-04-03
Payer: COMMERCIAL

## 2019-04-03 VITALS
TEMPERATURE: 98 F | HEART RATE: 89 BPM | OXYGEN SATURATION: 98 % | DIASTOLIC BLOOD PRESSURE: 80 MMHG | RESPIRATION RATE: 18 BRPM | SYSTOLIC BLOOD PRESSURE: 124 MMHG

## 2019-04-03 DIAGNOSIS — J30.89 ENVIRONMENTAL AND SEASONAL ALLERGIES: Primary | ICD-10-CM

## 2019-04-03 PROCEDURE — 99213 OFFICE O/P EST LOW 20 MIN: CPT | Performed by: NURSE PRACTITIONER

## 2019-04-03 RX ORDER — BENZONATATE 200 MG/1
200 CAPSULE ORAL 3 TIMES DAILY PRN
Qty: 30 CAPSULE | Refills: 0 | Status: SHIPPED | OUTPATIENT
Start: 2019-04-03 | End: 2019-07-19 | Stop reason: ALTCHOICE

## 2019-04-03 RX ORDER — FLUTICASONE PROPIONATE 50 MCG
2 SPRAY, SUSPENSION (ML) NASAL DAILY
Qty: 1 BOTTLE | Refills: 0 | Status: SHIPPED | OUTPATIENT
Start: 2019-04-03 | End: 2019-04-17

## 2019-04-03 RX ORDER — LORATADINE 10 MG/1
10 TABLET ORAL DAILY
Qty: 14 TABLET | Refills: 0 | Status: SHIPPED | OUTPATIENT
Start: 2019-04-03 | End: 2019-04-17

## 2019-04-03 RX ORDER — ALBUTEROL SULFATE 90 UG/1
2 AEROSOL, METERED RESPIRATORY (INHALATION) EVERY 6 HOURS PRN
Qty: 1 INHALER | Refills: 0 | Status: SHIPPED | OUTPATIENT
Start: 2019-04-03 | End: 2019-07-19 | Stop reason: ALTCHOICE

## 2019-04-10 RX ORDER — DEXTROAMPHETAMINE SACCHARATE, AMPHETAMINE ASPARTATE MONOHYDRATE, DEXTROAMPHETAMINE SULFATE AND AMPHETAMINE SULFATE 6.25; 6.25; 6.25; 6.25 MG/1; MG/1; MG/1; MG/1
25 CAPSULE, EXTENDED RELEASE ORAL DAILY
Qty: 30 CAPSULE | Refills: 0 | Status: SHIPPED | OUTPATIENT
Start: 2019-05-10 | End: 2019-06-09

## 2019-04-10 RX ORDER — DEXTROAMPHETAMINE SACCHARATE, AMPHETAMINE ASPARTATE MONOHYDRATE, DEXTROAMPHETAMINE SULFATE AND AMPHETAMINE SULFATE 6.25; 6.25; 6.25; 6.25 MG/1; MG/1; MG/1; MG/1
25 CAPSULE, EXTENDED RELEASE ORAL DAILY
Qty: 30 CAPSULE | Refills: 0 | Status: SHIPPED | OUTPATIENT
Start: 2019-04-10 | End: 2019-05-10

## 2019-04-10 RX ORDER — DEXTROAMPHETAMINE SACCHARATE, AMPHETAMINE ASPARTATE MONOHYDRATE, DEXTROAMPHETAMINE SULFATE AND AMPHETAMINE SULFATE 6.25; 6.25; 6.25; 6.25 MG/1; MG/1; MG/1; MG/1
25 CAPSULE, EXTENDED RELEASE ORAL DAILY
Qty: 30 CAPSULE | Refills: 0 | Status: SHIPPED | OUTPATIENT
Start: 2019-06-09 | End: 2019-06-13

## 2019-04-10 NOTE — TELEPHONE ENCOUNTER
Left message for the pt that her scripts are ready and that she is due for follow up prior to more refills

## 2019-04-10 NOTE — TELEPHONE ENCOUNTER
Pt called, needs refill on ADDERALL-3 months worth. Pt will  scripts. Pt states she only has 2 pills left.   Please call pt at [phone]

## 2019-04-10 NOTE — PROGRESS NOTES
CHIEF COMPLAINT:   Patient presents with:  Cough: sore throat x 1 week       HPI:   Stephie Guerrero is a 20year old female who presents for upper respiratory symptoms for  1 weeks.  Patient reports sore throat, congestion, dry cough, denies fever, denie Alcohol/week: 0.0 oz      Comment: occasionally    Drug use: No        REVIEW OF SYSTEMS:   GENERAL: normal appetite  SKIN: no rashes or abnormal skin lesions  HEENT: See HPI  LUNGS: denies shortness of breath or wheezing, See HPI  CARDIOVASCULAR: dajuan • benzonatate 200 MG Oral Cap 30 capsule 0     Sig: Take 1 capsule (200 mg total) by mouth 3 (three) times daily as needed for cough. • Fluticasone Propionate 50 MCG/ACT Nasal Suspension 1 Bottle 0     Si sprays by Each Nare route daily for 14 days. House dust mites:  · Wash bedding every week in warm water and detergent and dry on a hot setting. · Cover the mattress, box spring, and pillows with allergy covers.   · If possible, sleep in a room with no carpet, curtains, or upholstered furniture.   Vanessa
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
## 2019-07-05 ENCOUNTER — TELEPHONE (OUTPATIENT)
Dept: FAMILY MEDICINE CLINIC | Facility: CLINIC | Age: 24
End: 2019-07-05

## 2019-07-05 DIAGNOSIS — Z30.09 BIRTH CONTROL COUNSELING: Primary | ICD-10-CM

## 2019-07-05 RX ORDER — MEDROXYPROGESTERONE ACETATE 150 MG/ML
150 INJECTION, SUSPENSION INTRAMUSCULAR
Qty: 0.9 MG | Refills: 0 | Status: SHIPPED | OUTPATIENT
Start: 2019-07-05 | End: 2020-01-03

## 2019-07-05 NOTE — TELEPHONE ENCOUNTER
Looks like she has another week's worth with the script for adderall. I can give her a script closer to the time next week, but she needs to make an appt before any other scripts. I can order the depo, that is fine.    I can see her on the 19th, that wo

## 2019-07-05 NOTE — TELEPHONE ENCOUNTER
Patient would like to switch over to Dr Loly Roach giving her the Depo Shot instead of having the OB/GYN do it. She is due for her next depo shot on the week of July 15th. Her last depo shot was 04/23/19. Patient also needs to have her Adderall refilled.  Due t

## 2019-07-05 NOTE — TELEPHONE ENCOUNTER
Patient notified and verbalized understanding. She will call  to time to request refill.     Scheduled appt 7/19/19  Future Appointments   Date Time Provider Fahad Thomason   7/19/2019  1:30 PM Yolette Jacobson [facility] [place] ANTONY Ludwig

## 2019-07-11 DIAGNOSIS — F90.0 ATTENTION DEFICIT HYPERACTIVITY DISORDER (ADHD), PREDOMINANTLY INATTENTIVE TYPE: ICD-10-CM

## 2019-07-11 RX ORDER — DEXTROAMPHETAMINE SACCHARATE, AMPHETAMINE ASPARTATE MONOHYDRATE, DEXTROAMPHETAMINE SULFATE AND AMPHETAMINE SULFATE 6.25; 6.25; 6.25; 6.25 MG/1; MG/1; MG/1; MG/1
25 CAPSULE, EXTENDED RELEASE ORAL DAILY
Qty: 30 CAPSULE | Refills: 0 | Status: SHIPPED | OUTPATIENT
Start: 2019-07-11 | End: 2019-08-10

## 2019-07-11 NOTE — TELEPHONE ENCOUNTER
Script ready for . Patient notified via detailed voicemail left at cell number (ok per  HIPAA consent)    Script placed in  folder.

## 2019-07-11 NOTE — TELEPHONE ENCOUNTER
Depo was already sent 7/5/19 (see 7/5/19 tel enc)    Last OV 1/7/19  adderall last refilled 6/13/19 #30  0 refills

## 2019-07-19 PROCEDURE — 36415 COLL VENOUS BLD VENIPUNCTURE: CPT | Performed by: FAMILY MEDICINE

## 2019-07-19 PROCEDURE — 86480 TB TEST CELL IMMUN MEASURE: CPT | Performed by: FAMILY MEDICINE

## 2019-07-19 NOTE — PROGRESS NOTES
Kia Capellan is a 20year old female. Patient presents with: Follow - Up: on Adderall, needs work px   Contraception: Depo shot      HPI:   Depo shot: doing well with this. Due for it today. Bleeding a lot. No sexual activity in the past 2 months. : 124/80  01/07/19 : 126/88  11/10/18 : 122/82  11/06/18 : 124/84  01/22/18 : 118/72      Wt Readings from Last 6 Encounters:  07/19/19 : 297 lb  01/07/19 : (!) 324 lb  11/10/18 : (!) 332 lb  11/06/18 : (!) 332 lb  01/22/18 : (!) 341 lb 12.8 oz  06/01/17 : TUBE)    okay to give without urine preg test, see converstation above. Pt is confident she is not pregnant and is refusing a urine preg test today. Tb test for work today.      Orders Placed This Encounter      QUANTIFERON TB GOLD (IN TUBE) [29957] [Q]

## 2019-10-11 ENCOUNTER — NURSE ONLY (OUTPATIENT)
Dept: FAMILY MEDICINE CLINIC | Facility: CLINIC | Age: 24
End: 2019-10-11

## 2019-10-11 DIAGNOSIS — Z30.42 ENCOUNTER FOR MANAGEMENT AND INJECTION OF INJECTABLE PROGESTIN CONTRACEPTIVE: Primary | ICD-10-CM

## 2019-10-11 DIAGNOSIS — F90.0 ATTENTION DEFICIT HYPERACTIVITY DISORDER (ADHD), PREDOMINANTLY INATTENTIVE TYPE: ICD-10-CM

## 2019-10-11 PROCEDURE — 96372 THER/PROPH/DIAG INJ SC/IM: CPT | Performed by: FAMILY MEDICINE

## 2019-10-11 RX ORDER — DEXTROAMPHETAMINE SACCHARATE, AMPHETAMINE ASPARTATE MONOHYDRATE, DEXTROAMPHETAMINE SULFATE AND AMPHETAMINE SULFATE 6.25; 6.25; 6.25; 6.25 MG/1; MG/1; MG/1; MG/1
25 CAPSULE, EXTENDED RELEASE ORAL DAILY
Qty: 30 CAPSULE | Refills: 0 | Status: SHIPPED | OUTPATIENT
Start: 2019-10-11 | End: 2019-11-13

## 2019-10-11 RX ORDER — MEDROXYPROGESTERONE ACETATE 150 MG/ML
150 INJECTION, SUSPENSION INTRAMUSCULAR ONCE
Status: COMPLETED | OUTPATIENT
Start: 2019-10-11 | End: 2019-10-11

## 2019-10-11 RX ADMIN — MEDROXYPROGESTERONE ACETATE 150 MG: 150 INJECTION, SUSPENSION INTRAMUSCULAR at 13:26:00

## 2019-10-11 NOTE — TELEPHONE ENCOUNTER
Patient brought 9/17/19 adderall paper script to office.   Pharmacy will not fill due to issue date of 7/19/19  Requests new script sent to [facility]

## 2019-10-11 NOTE — PROGRESS NOTES
Patient to clinic for Depo Provera injection  Last given 7/19/19  IM left deltoid. Due 10/11/19-10/18/19    Patient received Depo Provera 150 mg IM right deltoid.   Advised to return 1/3/20-1/10/20

## 2019-11-18 ENCOUNTER — TELEPHONE (OUTPATIENT)
Dept: FAMILY MEDICINE CLINIC | Facility: CLINIC | Age: 24
End: 2019-11-18

## 2019-11-18 DIAGNOSIS — F90.0 ATTENTION DEFICIT HYPERACTIVITY DISORDER (ADHD), PREDOMINANTLY INATTENTIVE TYPE: ICD-10-CM

## 2019-11-18 RX ORDER — DEXTROAMPHETAMINE SACCHARATE, AMPHETAMINE ASPARTATE MONOHYDRATE, DEXTROAMPHETAMINE SULFATE AND AMPHETAMINE SULFATE 6.25; 6.25; 6.25; 6.25 MG/1; MG/1; MG/1; MG/1
25 CAPSULE, EXTENDED RELEASE ORAL DAILY
Qty: 30 CAPSULE | Refills: 0 | Status: SHIPPED | OUTPATIENT
Start: 2019-11-18 | End: 2019-12-17

## 2019-11-18 NOTE — TELEPHONE ENCOUNTER
Pt called, can we call in her ADDERALL script to the [facility] as the script was almost $100.00 at [facility].   Please call pt at [phone]

## 2019-12-17 DIAGNOSIS — F90.0 ATTENTION DEFICIT HYPERACTIVITY DISORDER (ADHD), PREDOMINANTLY INATTENTIVE TYPE: ICD-10-CM

## 2019-12-17 RX ORDER — DEXTROAMPHETAMINE SACCHARATE, AMPHETAMINE ASPARTATE MONOHYDRATE, DEXTROAMPHETAMINE SULFATE AND AMPHETAMINE SULFATE 6.25; 6.25; 6.25; 6.25 MG/1; MG/1; MG/1; MG/1
25 CAPSULE, EXTENDED RELEASE ORAL DAILY
Qty: 30 CAPSULE | Refills: 0 | Status: SHIPPED | OUTPATIENT
Start: 2019-12-17 | End: 2020-01-15

## 2019-12-17 NOTE — TELEPHONE ENCOUNTER
LOV: 7/19/19   Last Refill: 11/18/19  #30 0 Rf    Future Appointments   Date Time Provider Fahad Thomason   1/10/2020  2:00 PM  [facility] [address] [facility] Jacqueline Steele

## 2019-12-23 ENCOUNTER — TELEPHONE (OUTPATIENT)
Dept: FAMILY MEDICINE CLINIC | Facility: CLINIC | Age: 24
End: 2019-12-23

## 2019-12-23 NOTE — TELEPHONE ENCOUNTER
I would suggest she do a pregnancy test (at home urine or here) and if negative I would continue to monitor unless feeling lightheaded, or dizzy (in which case go to ). Can see breakthrough bleeding sometimes on depo. If preg test + call us.

## 2019-12-23 NOTE — TELEPHONE ENCOUNTER
Pt called, states that lately she is having some vaginal bleeding and she is on the Depo shot. She has not experienced this before.    Please call pt at [phone]

## 2019-12-23 NOTE — TELEPHONE ENCOUNTER
Pt has had bleeding for 2 weeks having a lot of clots, bright red for almost 2 weeks. Some cramping on and off. Has been on depo about 1 year. She states she is not due for her next depo until January. No abdominal pain no fevers.     Forward to Dr. Carley Lincoln, p

## 2019-12-23 NOTE — TELEPHONE ENCOUNTER
Pt states she has not had intercourse for over 2 months and insists there is noway she is pregnant. She states she will continue to monitor and go to UC if anything changes. No other questions asked.

## 2019-12-30 ENCOUNTER — TELEPHONE (OUTPATIENT)
Dept: FAMILY MEDICINE CLINIC | Facility: CLINIC | Age: 24
End: 2019-12-30

## 2019-12-30 DIAGNOSIS — Z30.42 ENCOUNTER FOR SURVEILLANCE OF INJECTABLE CONTRACEPTIVE: Primary | ICD-10-CM

## 2019-12-30 NOTE — TELEPHONE ENCOUNTER
That's fine to give it early but do preg test first please, cont iron supplement for 1 month after bleeding stops

## 2019-12-30 NOTE — TELEPHONE ENCOUNTER
Spoke to pt. Pt has had vaginal bleeding for 3 weeks now. She is bleeding less frequently than she was a week ago and no longer has clots. It is still bright red blood and she fills up a tampon every 3-4 hours. Before it was every hour last week.    She

## 2019-12-30 NOTE — TELEPHONE ENCOUNTER
Left detailed message per HIPPA Form. Pt needs to call back to schedule nurse visit for  pregnancy test and depo shot.

## 2019-12-30 NOTE — TELEPHONE ENCOUNTER
Pt called she has been bleeding for 3 weeks. She is due for depo shot next Friday. Pt is wondering if we could get her in sooner to do depo shot? She doesn't think she is going to stop bleeding until she gets the depo shot ? Please advise and call back.  Tyra Ureña

## 2020-01-03 DIAGNOSIS — Z30.09 BIRTH CONTROL COUNSELING: ICD-10-CM

## 2020-01-03 RX ORDER — MEDROXYPROGESTERONE ACETATE 150 MG/ML
150 INJECTION, SUSPENSION INTRAMUSCULAR
Qty: 1 SYRINGE | Refills: 0 | Status: SHIPPED | OUTPATIENT
Start: 2020-01-03 | End: 2020-02-28

## 2020-01-06 ENCOUNTER — PATIENT MESSAGE (OUTPATIENT)
Dept: FAMILY MEDICINE CLINIC | Facility: CLINIC | Age: 25
End: 2020-01-06

## 2020-01-06 DIAGNOSIS — Z30.09 BIRTH CONTROL COUNSELING: ICD-10-CM

## 2020-01-06 NOTE — TELEPHONE ENCOUNTER
From: Dannielle Joe  To: Moriah Up DO  Sent: 1/6/2020 10:17 AM CST  Subject: Other    Good morning!  I just wanted to let you know i picked up my depo shot, and my mom gave it to me friday evening (1-3-20) Thank you :) -Gracie Schumacher

## 2020-01-15 DIAGNOSIS — F90.0 ATTENTION DEFICIT HYPERACTIVITY DISORDER (ADHD), PREDOMINANTLY INATTENTIVE TYPE: ICD-10-CM

## 2020-01-16 RX ORDER — DEXTROAMPHETAMINE SACCHARATE, AMPHETAMINE ASPARTATE MONOHYDRATE, DEXTROAMPHETAMINE SULFATE AND AMPHETAMINE SULFATE 6.25; 6.25; 6.25; 6.25 MG/1; MG/1; MG/1; MG/1
25 CAPSULE, EXTENDED RELEASE ORAL DAILY
Qty: 30 CAPSULE | Refills: 0 | Status: SHIPPED | OUTPATIENT
Start: 2020-01-16 | End: 2020-02-14

## 2020-01-20 RX ORDER — MEDROXYPROGESTERONE ACETATE 150 MG/ML
150 INJECTION, SUSPENSION INTRAMUSCULAR ONCE
Status: SHIPPED | OUTPATIENT
Start: 2020-01-03

## 2020-02-14 DIAGNOSIS — F90.0 ATTENTION DEFICIT HYPERACTIVITY DISORDER (ADHD), PREDOMINANTLY INATTENTIVE TYPE: ICD-10-CM

## 2020-02-14 RX ORDER — DEXTROAMPHETAMINE SACCHARATE, AMPHETAMINE ASPARTATE MONOHYDRATE, DEXTROAMPHETAMINE SULFATE AND AMPHETAMINE SULFATE 6.25; 6.25; 6.25; 6.25 MG/1; MG/1; MG/1; MG/1
25 CAPSULE, EXTENDED RELEASE ORAL DAILY
Qty: 30 CAPSULE | Refills: 0 | Status: SHIPPED | OUTPATIENT
Start: 2020-02-14 | End: 2020-03-15

## 2020-02-14 NOTE — TELEPHONE ENCOUNTER
No refill protocol for this medication. Last refill: 1- #30 with 0 refills  Last Visit: 7-  Next Visit: No future appointments. Forward to Dr. George Tovar please advise on refills. Thanks.

## 2020-02-28 ENCOUNTER — TELEPHONE (OUTPATIENT)
Dept: FAMILY MEDICINE CLINIC | Facility: CLINIC | Age: 25
End: 2020-02-28

## 2020-02-28 DIAGNOSIS — Z30.09 BIRTH CONTROL COUNSELING: ICD-10-CM

## 2020-02-28 RX ORDER — MEDROXYPROGESTERONE ACETATE 150 MG/ML
150 INJECTION, SUSPENSION INTRAMUSCULAR
Qty: 1 SYRINGE | Refills: 5 | Status: SHIPPED | OUTPATIENT
Start: 2020-02-28 | End: 2021-03-13

## 2020-02-28 NOTE — TELEPHONE ENCOUNTER
Patient notified and verbalized understanding. States if KE will let her continue to give injections at home she would prefer to do so. Confirmed dates for injection with patient. Last given 1/3/2020, due 3/27- 4/3/2020.   Per patient she will give i

## 2020-02-28 NOTE — TELEPHONE ENCOUNTER
Pt is coming in 3-31-20 for her DEPO, I believe it needs to picked up at pharamcy.  Please order  othe

## 2020-03-15 DIAGNOSIS — F90.0 ATTENTION DEFICIT HYPERACTIVITY DISORDER (ADHD), PREDOMINANTLY INATTENTIVE TYPE: ICD-10-CM

## 2020-03-16 RX ORDER — DEXTROAMPHETAMINE SACCHARATE, AMPHETAMINE ASPARTATE MONOHYDRATE, DEXTROAMPHETAMINE SULFATE AND AMPHETAMINE SULFATE 6.25; 6.25; 6.25; 6.25 MG/1; MG/1; MG/1; MG/1
25 CAPSULE, EXTENDED RELEASE ORAL DAILY
Qty: 30 CAPSULE | Refills: 0 | Status: SHIPPED | OUTPATIENT
Start: 2020-03-16 | End: 2020-04-13

## 2020-03-16 NOTE — TELEPHONE ENCOUNTER
Routing to provider per protocol. Last refilled on 2/14/20 for # 30 with 0 rf. Last seen on 7/19/19. Future Appointments   Date Time Provider Fahad Thomason   3/31/2020 11:00 AM [facility] NURSE YAHIR Wesley        Thank you.

## 2020-03-30 ENCOUNTER — PATIENT MESSAGE (OUTPATIENT)
Dept: FAMILY MEDICINE CLINIC | Facility: CLINIC | Age: 25
End: 2020-03-30

## 2020-03-30 NOTE — TELEPHONE ENCOUNTER
From: Ashok Garcia  To: Jerris Cranker, DO  Sent: 3/30/2020 11:00 AM CDT  Subject: Other    Hi dr Radha Ferrell!  I just got an email about a nurse visit on Tuesday for my depo, but i spoke to [facility] a few weeks ago and she said it was okay for my mom to do the de

## 2020-04-13 DIAGNOSIS — F90.0 ATTENTION DEFICIT HYPERACTIVITY DISORDER (ADHD), PREDOMINANTLY INATTENTIVE TYPE: ICD-10-CM

## 2020-04-14 RX ORDER — DEXTROAMPHETAMINE SACCHARATE, AMPHETAMINE ASPARTATE MONOHYDRATE, DEXTROAMPHETAMINE SULFATE AND AMPHETAMINE SULFATE 6.25; 6.25; 6.25; 6.25 MG/1; MG/1; MG/1; MG/1
25 CAPSULE, EXTENDED RELEASE ORAL DAILY
Qty: 30 CAPSULE | Refills: 0 | Status: SHIPPED | OUTPATIENT
Start: 2020-04-14 | End: 2020-05-15

## 2020-04-14 NOTE — TELEPHONE ENCOUNTER
Refill request per protocol: none  Last refilled 3/16/20 #30  LOV 7/19/19  No future appt  Routed to PCP to advise

## 2020-05-15 DIAGNOSIS — F90.0 ATTENTION DEFICIT HYPERACTIVITY DISORDER (ADHD), PREDOMINANTLY INATTENTIVE TYPE: ICD-10-CM

## 2020-05-15 RX ORDER — DEXTROAMPHETAMINE SACCHARATE, AMPHETAMINE ASPARTATE MONOHYDRATE, DEXTROAMPHETAMINE SULFATE AND AMPHETAMINE SULFATE 6.25; 6.25; 6.25; 6.25 MG/1; MG/1; MG/1; MG/1
25 CAPSULE, EXTENDED RELEASE ORAL DAILY
Qty: 30 CAPSULE | Refills: 0 | Status: SHIPPED | OUTPATIENT
Start: 2020-05-15 | End: 2020-06-14

## 2020-06-16 ENCOUNTER — TELEPHONE (OUTPATIENT)
Dept: FAMILY MEDICINE CLINIC | Facility: CLINIC | Age: 25
End: 2020-06-16

## 2020-06-16 DIAGNOSIS — Z30.09 BIRTH CONTROL COUNSELING: ICD-10-CM

## 2020-06-16 RX ORDER — MEDROXYPROGESTERONE ACETATE 150 MG/ML
150 INJECTION, SUSPENSION INTRAMUSCULAR
Qty: 1 SYRINGE | Refills: 5 | OUTPATIENT
Start: 2020-06-16

## 2020-06-16 NOTE — TELEPHONE ENCOUNTER
Pt called wanting to know why he birth control was denied? She states she doesn't have any refills at the pharmacy and she has tried calling them.

## 2020-06-16 NOTE — TELEPHONE ENCOUNTER
Spoke with patient and advised her that there are refills on file for her. Patient states that she never gets refills - she wasn't informed that there where refills. Pharmacy contacted and verified that the medroxyprogesterone has refills available.  Markos ivan

## 2020-06-25 ENCOUNTER — PATIENT MESSAGE (OUTPATIENT)
Dept: FAMILY MEDICINE CLINIC | Facility: CLINIC | Age: 25
End: 2020-06-25

## 2020-06-25 DIAGNOSIS — Z30.42 ENCOUNTER FOR SURVEILLANCE OF INJECTABLE CONTRACEPTIVE: Primary | ICD-10-CM

## 2020-06-25 NOTE — TELEPHONE ENCOUNTER
From: Yuliya Bowers  To: Sarah Gomez DO  Sent: 6/25/2020 10:40 AM CDT  Subject: Non-Urgent Medical Question    Hi Dr Berta Shane!  I just wanted to let you know that my mom just did my depo shot this morning (6-25) Just wanted to make sure I let you know the

## 2020-06-29 RX ORDER — MEDROXYPROGESTERONE ACETATE 150 MG/ML
150 INJECTION, SUSPENSION INTRAMUSCULAR ONCE
Status: SHIPPED | OUTPATIENT
Start: 2020-06-25

## 2020-07-15 DIAGNOSIS — F90.0 ATTENTION DEFICIT HYPERACTIVITY DISORDER (ADHD), PREDOMINANTLY INATTENTIVE TYPE: ICD-10-CM

## 2020-07-15 RX ORDER — DEXTROAMPHETAMINE SACCHARATE, AMPHETAMINE ASPARTATE MONOHYDRATE, DEXTROAMPHETAMINE SULFATE AND AMPHETAMINE SULFATE 6.25; 6.25; 6.25; 6.25 MG/1; MG/1; MG/1; MG/1
25 CAPSULE, EXTENDED RELEASE ORAL DAILY
Qty: 30 CAPSULE | Refills: 0 | Status: SHIPPED | OUTPATIENT
Start: 2020-07-15 | End: 2020-08-14

## 2020-07-16 NOTE — TELEPHONE ENCOUNTER
Script sent. She is due med follow up, please schedule. She really should be seen in office every 6 months while on this medication, it has been a year.

## 2020-07-17 NOTE — TELEPHONE ENCOUNTER
Future Appointments   Date Time Provider Fahad Thomason   7/31/2020 11:00 AM Lyla Alpers, DO [facility]

## 2020-07-31 NOTE — PROGRESS NOTES
Mukesh Baker is a 30year old female. Patient presents with:  Medication Follow-Up: no concerns       HPI:   Adhd: on adderall and doing well. No side effects. Sleeping and eating well. Depo: doing well with this.  Her mom is giving it to her at home kg)  11/10/18 : (!) 332 lb (150.6 kg)  11/06/18 : (!) 332 lb (150.6 kg)  01/22/18 : (!) 341 lb 12.8 oz (155 kg)      REVIEW OF SYSTEMS:   GENERAL HEALTH: feels well no complaints other than above   SKIN: denies any unusual skin lesions or rashes  RESPIRATO opinon/consult on skin folds and when/how to get those removed. Excess skin of arm  -     PLASTIC SURGERY - INTERNAL    Weight loss  -     PLASTIC SURGERY - INTERNAL        No orders of the defined types were placed in this encounter.               Meds

## 2020-08-14 DIAGNOSIS — F90.0 ATTENTION DEFICIT HYPERACTIVITY DISORDER (ADHD), PREDOMINANTLY INATTENTIVE TYPE: ICD-10-CM

## 2020-08-14 RX ORDER — DEXTROAMPHETAMINE SACCHARATE, AMPHETAMINE ASPARTATE MONOHYDRATE, DEXTROAMPHETAMINE SULFATE AND AMPHETAMINE SULFATE 6.25; 6.25; 6.25; 6.25 MG/1; MG/1; MG/1; MG/1
25 CAPSULE, EXTENDED RELEASE ORAL DAILY
Qty: 30 CAPSULE | Refills: 0 | Status: CANCELLED | OUTPATIENT
Start: 2020-08-14 | End: 2020-09-13

## 2020-09-02 ENCOUNTER — PATIENT MESSAGE (OUTPATIENT)
Dept: FAMILY MEDICINE CLINIC | Facility: CLINIC | Age: 25
End: 2020-09-02

## 2020-09-02 DIAGNOSIS — Z30.42 ENCOUNTER FOR SURVEILLANCE OF INJECTABLE CONTRACEPTIVE: Primary | ICD-10-CM

## 2020-09-03 NOTE — TELEPHONE ENCOUNTER
See 6/25/2020 [employer] message, Last depo was 6/25/2020. Next depo due 9/17-9/23/2020.   Patient notified via [employer]

## 2020-09-03 NOTE — TELEPHONE ENCOUNTER
From: Jemima Nolan  To: Mejia Baltazar DO  Sent: 9/2/2020 5:23 PM CDT  Subject: Non-Urgent Medical Question    Hello! I was just wondering what the date range is for my next depo shot?

## 2020-09-15 ENCOUNTER — TELEPHONE (OUTPATIENT)
Dept: FAMILY MEDICINE CLINIC | Facility: CLINIC | Age: 25
End: 2020-09-15

## 2020-09-15 NOTE — TELEPHONE ENCOUNTER
Last refilled 7/31/2020  3 months scripts    Patient advised to contact pharmacy for refill. States she did and was told no refills available. Called and spoke with Oumou who states there are refills on file, she will fill for patient.      Patient

## 2020-09-24 RX ORDER — MEDROXYPROGESTERONE ACETATE 150 MG/ML
150 INJECTION, SUSPENSION INTRAMUSCULAR ONCE
Status: SHIPPED | OUTPATIENT
Start: 2020-09-22

## 2020-10-23 ENCOUNTER — TELEMEDICINE (OUTPATIENT)
Dept: FAMILY MEDICINE CLINIC | Facility: CLINIC | Age: 25
End: 2020-10-23
Payer: COMMERCIAL

## 2020-10-23 DIAGNOSIS — F90.0 ATTENTION DEFICIT HYPERACTIVITY DISORDER (ADHD), PREDOMINANTLY INATTENTIVE TYPE: ICD-10-CM

## 2020-10-23 PROCEDURE — 99214 OFFICE O/P EST MOD 30 MIN: CPT | Performed by: FAMILY MEDICINE

## 2020-10-23 RX ORDER — DEXTROAMPHETAMINE SACCHARATE, AMPHETAMINE ASPARTATE MONOHYDRATE, DEXTROAMPHETAMINE SULFATE AND AMPHETAMINE SULFATE 7.5; 7.5; 7.5; 7.5 MG/1; MG/1; MG/1; MG/1
30 CAPSULE, EXTENDED RELEASE ORAL DAILY
Qty: 30 CAPSULE | Refills: 0 | Status: SHIPPED | OUTPATIENT
Start: 2020-10-23 | End: 2020-12-03

## 2020-10-23 NOTE — PROGRESS NOTES
This is a telemedicine visit with live, interactive video and audio. Patient understands and accepts financial responsibility for any deductible, co-insurance and/or co-pays associated with this service.     SUBJECTIVE  ADHD: noticing for over a month t Amphetamine-Dextroamphet ER (ADDERALL XR) 30 MG Oral Capsule SR 24 Hr; Take 1 capsule (30 mg total) by mouth daily. increase dose to 30 mg daily. Follow up in 1 month with update. Advised she may have to wait to fill until her last script is up.

## 2021-01-02 DIAGNOSIS — F90.0 ATTENTION DEFICIT HYPERACTIVITY DISORDER (ADHD), PREDOMINANTLY INATTENTIVE TYPE: ICD-10-CM

## 2021-01-02 NOTE — TELEPHONE ENCOUNTER
Pt needs a refill of the  Amphetamine-Dextroamphet ER (ADDERALL XR) 30 MG Oral Capsule SR 24 Hr    Destinee in [facility] out of office today

## 2021-01-04 RX ORDER — DEXTROAMPHETAMINE SACCHARATE, AMPHETAMINE ASPARTATE MONOHYDRATE, DEXTROAMPHETAMINE SULFATE AND AMPHETAMINE SULFATE 7.5; 7.5; 7.5; 7.5 MG/1; MG/1; MG/1; MG/1
30 CAPSULE, EXTENDED RELEASE ORAL DAILY
Qty: 30 CAPSULE | Refills: 0 | Status: SHIPPED | OUTPATIENT
Start: 2021-01-04 | End: 2021-02-05

## 2021-03-06 ENCOUNTER — TELEPHONE (OUTPATIENT)
Dept: FAMILY MEDICINE CLINIC | Facility: CLINIC | Age: 26
End: 2021-03-06

## 2021-03-06 DIAGNOSIS — F90.0 ATTENTION DEFICIT HYPERACTIVITY DISORDER (ADHD), PREDOMINANTLY INATTENTIVE TYPE: ICD-10-CM

## 2021-03-06 RX ORDER — DEXTROAMPHETAMINE SACCHARATE, AMPHETAMINE ASPARTATE MONOHYDRATE, DEXTROAMPHETAMINE SULFATE AND AMPHETAMINE SULFATE 7.5; 7.5; 7.5; 7.5 MG/1; MG/1; MG/1; MG/1
30 CAPSULE, EXTENDED RELEASE ORAL DAILY
Qty: 30 CAPSULE | Refills: 0 | Status: CANCELLED | OUTPATIENT
Start: 2021-03-06 | End: 2021-04-05

## 2021-03-13 DIAGNOSIS — Z30.09 BIRTH CONTROL COUNSELING: ICD-10-CM

## 2021-03-15 RX ORDER — MEDROXYPROGESTERONE ACETATE 150 MG/ML
150 INJECTION, SUSPENSION INTRAMUSCULAR
Qty: 1 SYRINGE | Refills: 3 | Status: SHIPPED | OUTPATIENT
Start: 2021-03-15 | End: 2021-08-27

## 2021-04-09 DIAGNOSIS — Z23 NEED FOR VACCINATION: ICD-10-CM

## 2021-05-06 ENCOUNTER — PATIENT MESSAGE (OUTPATIENT)
Dept: FAMILY MEDICINE CLINIC | Facility: CLINIC | Age: 26
End: 2021-05-06

## 2021-05-06 DIAGNOSIS — F90.0 ATTENTION DEFICIT HYPERACTIVITY DISORDER (ADHD), PREDOMINANTLY INATTENTIVE TYPE: ICD-10-CM

## 2021-05-06 RX ORDER — DEXTROAMPHETAMINE SACCHARATE, AMPHETAMINE ASPARTATE MONOHYDRATE, DEXTROAMPHETAMINE SULFATE AND AMPHETAMINE SULFATE 7.5; 7.5; 7.5; 7.5 MG/1; MG/1; MG/1; MG/1
30 CAPSULE, EXTENDED RELEASE ORAL DAILY
Qty: 30 CAPSULE | Refills: 0 | Status: SHIPPED | OUTPATIENT
Start: 2021-05-06 | End: 2021-06-03

## 2021-05-06 NOTE — TELEPHONE ENCOUNTER
From: Ankita Allen  To: Bhakti Orozco DO  Sent: 5/6/2021 12:56 PM CDT  Subject: Prescription Question    Hi Dr Queta Sparks!  I tried to call and fill my adderal prescription on tuesday evening and the pharmacist said i was out of refills but that he was going

## 2021-05-21 ENCOUNTER — TELEPHONE (OUTPATIENT)
Dept: FAMILY MEDICINE CLINIC | Facility: CLINIC | Age: 26
End: 2021-05-21

## 2021-05-21 DIAGNOSIS — F90.0 ATTENTION DEFICIT HYPERACTIVITY DISORDER (ADHD), PREDOMINANTLY INATTENTIVE TYPE: ICD-10-CM

## 2021-05-21 NOTE — TELEPHONE ENCOUNTER
Patient notified via detailed voicemail left at cell number (ok per  HIPAA consent) KE would be able to fill.   Asked to call  to time to request refill

## 2021-05-21 NOTE — TELEPHONE ENCOUNTER
Pt Adderall will run out a couple of days before pt's appt for med check on 6/7. Will Dr. Chuck Gibson refill pt's adderall before her scheduled appt on 6/7?     Please call pt at

## 2021-06-03 DIAGNOSIS — F90.0 ATTENTION DEFICIT HYPERACTIVITY DISORDER (ADHD), PREDOMINANTLY INATTENTIVE TYPE: ICD-10-CM

## 2021-06-03 RX ORDER — DEXTROAMPHETAMINE SACCHARATE, AMPHETAMINE ASPARTATE MONOHYDRATE, DEXTROAMPHETAMINE SULFATE AND AMPHETAMINE SULFATE 7.5; 7.5; 7.5; 7.5 MG/1; MG/1; MG/1; MG/1
30 CAPSULE, EXTENDED RELEASE ORAL DAILY
Qty: 30 CAPSULE | Refills: 0 | Status: SHIPPED | OUTPATIENT
Start: 2021-06-03 | End: 2021-07-03

## 2021-06-07 NOTE — PROGRESS NOTES
Yuliya Bowers is a 30year old female. Patient presents with: Follow - Up: on meds      HPI:   On Depo: doing okay with that. Due today, her mother gives it to her at home. ADHD: on adderall XR 30 mg once daily. No side effects.      Obesity: workin well no complaints  SKIN: denies any unusual skin lesions or rashes  RESPIRATORY: denies shortness of breath   CARDIOVASCULAR: denies chest pain    GI: denies abdominal pain and denies heartburn  NEURO: denies headaches or dizziness     EXAM:   /78 Prescriptions     Signed Prescriptions Disp Refills   • Amphetamine-Dextroamphet ER (ADDERALL XR) 30 MG Oral Capsule SR 24 Hr 30 capsule 0     Sig: Take 1 capsule (30 mg total) by mouth daily.    • Amphetamine-Dextroamphet ER (ADDERALL XR) 30 MG Oral Capsul

## 2021-06-21 ENCOUNTER — TELEPHONE (OUTPATIENT)
Dept: FAMILY MEDICINE CLINIC | Facility: CLINIC | Age: 26
End: 2021-06-21

## 2021-06-21 DIAGNOSIS — R05.9 COUGH: Primary | ICD-10-CM

## 2021-06-21 RX ORDER — BENZONATATE 100 MG/1
100 CAPSULE ORAL 3 TIMES DAILY PRN
Qty: 30 CAPSULE | Refills: 0 | Status: SHIPPED | OUTPATIENT
Start: 2021-06-21

## 2021-06-21 NOTE — TELEPHONE ENCOUNTER
Patient notified via detailed voicemail left at cell number (ok per  HIPAA consent)  Asked patient to call office back if she would like order for covid testing.

## 2021-06-21 NOTE — TELEPHONE ENCOUNTER
Pt called she has a cough. She said it is an annoying tickle in the back of her throat. She is wanting to know if the dr could send in the cough pills? She said over the counter medication is helping with post nasal drip but not the cough.   Pt declined V V

## 2021-06-21 NOTE — TELEPHONE ENCOUNTER
Patient states the tickle in her throat has been since Friday. States her allergies were bad last week. Had itchy nose, throat and eyes. This week she has the tickle in her throat and congestion in the morning that subsides when she is up.     Delsym is

## 2021-06-21 NOTE — TELEPHONE ENCOUNTER
Script sent, but if she is not vaccinated for COVID, I would recommend getting tested, just in case.

## 2021-07-04 ENCOUNTER — PATIENT MESSAGE (OUTPATIENT)
Dept: FAMILY MEDICINE CLINIC | Facility: CLINIC | Age: 26
End: 2021-07-04

## 2021-07-04 DIAGNOSIS — R05.9 COUGH: ICD-10-CM

## 2021-07-04 DIAGNOSIS — Z30.42 ENCOUNTER FOR SURVEILLANCE OF INJECTABLE CONTRACEPTIVE: Primary | ICD-10-CM

## 2021-07-04 RX ORDER — BENZONATATE 100 MG/1
100 CAPSULE ORAL 3 TIMES DAILY PRN
Qty: 30 CAPSULE | Refills: 0 | Status: CANCELLED | OUTPATIENT
Start: 2021-07-04

## 2021-07-06 ENCOUNTER — TELEPHONE (OUTPATIENT)
Dept: FAMILY MEDICINE CLINIC | Facility: CLINIC | Age: 26
End: 2021-07-06

## 2021-07-06 NOTE — TELEPHONE ENCOUNTER
Amphetamine-Dextroamphet ER (ADDERALL XR) 30 MG Oral Capsule SR 24 Hr 30 capsule     Patient unable to get her rx until 7/7/21. Her last rx was  on 6/4/21. Doesn't have caps left.  Please call # [phone]

## 2021-07-06 NOTE — TELEPHONE ENCOUNTER
Spoke with Nic Jurado at [facility] who confirmed 6/3 was the last time patient picked up adderall. Next script states 7/7/21 is the start date. Per KE, ok to fill today. Nic Jurado notified and verbalized understanding.     Patient notified and verbalized unders

## 2021-07-06 NOTE — TELEPHONE ENCOUNTER
From: Cem Edmondson  To: Denver San, DO  Sent: 7/4/2021 9:12 AM CDT  Subject: Prescription Question    Good morning! I was trying to send over a refill request for my adderall, but it sent over the benzonatate instead which I don't need more of.  I do ne

## 2021-08-04 ENCOUNTER — TELEPHONE (OUTPATIENT)
Dept: FAMILY MEDICINE CLINIC | Facility: CLINIC | Age: 26
End: 2021-08-04

## 2021-08-04 NOTE — TELEPHONE ENCOUNTER
PROBLEM WITH REFILL DATE ON SCRIPT PER PHARMACY- PT TRIED TO PICKUP BUT UNABLE TO / PLEASE CALL AND ADVISE     Amphetamine-Dextroamphet ER (ADDERALL XR) 30 MG Oral Capsule SR 24 Hr      [facility] #[id number] - [place], [place] - [address] [place] [address] AT [facility] OF

## 2021-08-04 NOTE — TELEPHONE ENCOUNTER
Per Terell Links at [employer], script is for 8/6 and patient may pick it up tomorrow. Spoke with patient and patient states will have enough for tomorrow but will need to pick prescription  Up tomorrow so she will have medication for Friday.  Advised she may

## 2021-08-27 DIAGNOSIS — Z30.09 BIRTH CONTROL COUNSELING: ICD-10-CM

## 2021-08-28 RX ORDER — MEDROXYPROGESTERONE ACETATE 150 MG/ML
150 INJECTION, SUSPENSION INTRAMUSCULAR
Qty: 1 EACH | Refills: 3 | Status: SHIPPED | OUTPATIENT
Start: 2021-08-28 | End: 2022-07-25

## 2021-08-28 NOTE — TELEPHONE ENCOUNTER
Routing to provider per protocol. medroxyPROGESTERone Acetate (DEPO-PROVERA) 150 MG/ML injection 150 mg    Last refilled on 6/7/21 for #150  with 0 rf. Last labs 2/6/17. Last seen on 6/7/21. No future appointments. Thank you.

## 2021-08-31 RX ORDER — MEDROXYPROGESTERONE ACETATE 150 MG/ML
150 INJECTION, SUSPENSION INTRAMUSCULAR ONCE
Status: SHIPPED | OUTPATIENT
Start: 2021-08-30

## 2021-08-31 NOTE — TELEPHONE ENCOUNTER
Depo administration entered in epic.   Next due date 11/22-11/29/21   Dates verified per JACKSON Chery

## 2021-09-04 DIAGNOSIS — F90.0 ATTENTION DEFICIT HYPERACTIVITY DISORDER (ADHD), PREDOMINANTLY INATTENTIVE TYPE: ICD-10-CM

## 2021-09-04 RX ORDER — DEXTROAMPHETAMINE SACCHARATE, AMPHETAMINE ASPARTATE MONOHYDRATE, DEXTROAMPHETAMINE SULFATE AND AMPHETAMINE SULFATE 7.5; 7.5; 7.5; 7.5 MG/1; MG/1; MG/1; MG/1
30 CAPSULE, EXTENDED RELEASE ORAL DAILY
Qty: 30 CAPSULE | Refills: 0 | Status: SHIPPED | OUTPATIENT
Start: 2021-09-04 | End: 2021-10-04

## 2021-09-04 NOTE — TELEPHONE ENCOUNTER
Pt is needing her adderall refilled. She called [facility] in [place] to have it refilled. They told her she didn't have any refills. Pt says EK just sent one over for her. Please take a look and call back. She only has one pill left.

## 2021-10-05 DIAGNOSIS — F90.0 ATTENTION DEFICIT HYPERACTIVITY DISORDER (ADHD), PREDOMINANTLY INATTENTIVE TYPE: ICD-10-CM

## 2021-10-05 RX ORDER — DEXTROAMPHETAMINE SACCHARATE, AMPHETAMINE ASPARTATE MONOHYDRATE, DEXTROAMPHETAMINE SULFATE AND AMPHETAMINE SULFATE 7.5; 7.5; 7.5; 7.5 MG/1; MG/1; MG/1; MG/1
30 CAPSULE, EXTENDED RELEASE ORAL DAILY
Qty: 30 CAPSULE | Refills: 0 | OUTPATIENT
Start: 2021-10-05 | End: 2021-11-04

## 2021-10-05 NOTE — TELEPHONE ENCOUNTER
Patient call requesting refill for:   Amphetamine-Dextroamphetamine 30     She has 1 pill left and.     [facility] [address] Demian Patel [address] [place] [address] ([address]), [phone], [phone]    Please advise # [phone]

## 2021-11-24 ENCOUNTER — PATIENT MESSAGE (OUTPATIENT)
Dept: FAMILY MEDICINE CLINIC | Facility: CLINIC | Age: 26
End: 2021-11-24

## 2021-11-24 DIAGNOSIS — Z30.42 ENCOUNTER FOR SURVEILLANCE OF INJECTABLE CONTRACEPTIVE: Primary | ICD-10-CM

## 2021-11-24 NOTE — TELEPHONE ENCOUNTER
From: Jana Donato  To: Denney Primrose, DO  Sent: 11/24/2021 3:54 PM CST  Subject: Depo    Hi dr Renetta Hernández! Just letting you know my mom did my shot today 11-24-21. Can you please let me know dates for the next one? Have a great Thanksgiving!

## 2021-11-26 RX ORDER — MEDROXYPROGESTERONE ACETATE 150 MG/ML
150 INJECTION, SUSPENSION INTRAMUSCULAR ONCE
Status: SHIPPED | OUTPATIENT
Start: 2021-11-24

## 2021-12-02 ENCOUNTER — TELEPHONE (OUTPATIENT)
Dept: FAMILY MEDICINE CLINIC | Facility: CLINIC | Age: 26
End: 2021-12-02

## 2021-12-02 DIAGNOSIS — F90.0 ATTENTION DEFICIT HYPERACTIVITY DISORDER (ADHD), PREDOMINANTLY INATTENTIVE TYPE: ICD-10-CM

## 2021-12-02 RX ORDER — DEXTROAMPHETAMINE SACCHARATE, AMPHETAMINE ASPARTATE MONOHYDRATE, DEXTROAMPHETAMINE SULFATE AND AMPHETAMINE SULFATE 7.5; 7.5; 7.5; 7.5 MG/1; MG/1; MG/1; MG/1
30 CAPSULE, EXTENDED RELEASE ORAL DAILY
Qty: 30 CAPSULE | Refills: 0 | Status: SHIPPED | OUTPATIENT
Start: 2021-12-02 | End: 2022-01-01

## 2021-12-02 NOTE — TELEPHONE ENCOUNTER
New script sent to the pharmacy      Left message for the pt that a new script was sent over and that she should be able to  the script today or tomorrwo  Advised to call if any questions

## 2021-12-02 NOTE — TELEPHONE ENCOUNTER
Pt called she would like to speak with nurse about   Amphetamine-Dextroamphet ER (ADDERALL XR) 30 MG Oral Capsule SR 24 Hr.  She states that her bottle says the 4th of each month but then when she goes to the pharmacy they tell her that she can't pick it up

## 2021-12-02 NOTE — TELEPHONE ENCOUNTER
Dispense date from [facility]  D-AMPHETAMINE ER 30MG SALT COMBO CP 11/04/2021 10/05/2021  30 each  [facility] #. ..        The script itself says not to dispense before 12/5/21    The pharmacy needs a new script

## 2021-12-06 ENCOUNTER — PATIENT MESSAGE (OUTPATIENT)
Dept: FAMILY MEDICINE CLINIC | Facility: CLINIC | Age: 26
End: 2021-12-06

## 2021-12-06 NOTE — TELEPHONE ENCOUNTER
From: Jose Guadalupe Conner  To: Pamela Jama DO  Sent: 12/6/2021 1:32 PM CST  Subject: Nausea    Hi Dr Teena Phan.  So I tested positive for covid last week, and although I'm feeling better for the most part, I have some off and on nausea and I was wondering if it w

## 2021-12-07 RX ORDER — ONDANSETRON 4 MG/1
4 TABLET, ORALLY DISINTEGRATING ORAL EVERY 8 HOURS PRN
Qty: 15 TABLET | Refills: 0 | Status: SHIPPED | OUTPATIENT
Start: 2021-12-07

## 2022-01-04 ENCOUNTER — TELEPHONE (OUTPATIENT)
Dept: FAMILY MEDICINE CLINIC | Facility: CLINIC | Age: 27
End: 2022-01-04

## 2022-01-04 DIAGNOSIS — F90.0 ATTENTION DEFICIT HYPERACTIVITY DISORDER (ADHD), PREDOMINANTLY INATTENTIVE TYPE: ICD-10-CM

## 2022-01-04 RX ORDER — DEXTROAMPHETAMINE SACCHARATE, AMPHETAMINE ASPARTATE MONOHYDRATE, DEXTROAMPHETAMINE SULFATE AND AMPHETAMINE SULFATE 7.5; 7.5; 7.5; 7.5 MG/1; MG/1; MG/1; MG/1
30 CAPSULE, EXTENDED RELEASE ORAL DAILY
Qty: 30 CAPSULE | Refills: 0 | Status: SHIPPED | OUTPATIENT
Start: 2022-01-04 | End: 2022-02-02

## 2022-01-04 NOTE — TELEPHONE ENCOUNTER
Pt called states went to pharmacy and was told script was not sent and she has nothing to  pt currently out of medication     (ADDERALL XR) 30 MG Oral Capsule SR 24 Hr    Pt call back # [phone]    Thank you

## 2022-01-04 NOTE — TELEPHONE ENCOUNTER
Last OV 6/7/21  Last refilled 10/5/21  3 month scripts    Routed to DS as covering provider to advise

## 2022-02-02 ENCOUNTER — PATIENT MESSAGE (OUTPATIENT)
Dept: FAMILY MEDICINE CLINIC | Facility: CLINIC | Age: 27
End: 2022-02-02

## 2022-02-02 RX ORDER — DEXTROAMPHETAMINE SACCHARATE, AMPHETAMINE ASPARTATE MONOHYDRATE, DEXTROAMPHETAMINE SULFATE AND AMPHETAMINE SULFATE 7.5; 7.5; 7.5; 7.5 MG/1; MG/1; MG/1; MG/1
30 CAPSULE, EXTENDED RELEASE ORAL DAILY
Qty: 30 CAPSULE | Refills: 0 | Status: SHIPPED | OUTPATIENT
Start: 2022-02-02 | End: 2022-03-01

## 2022-02-02 NOTE — TELEPHONE ENCOUNTER
Patient called requesting refill for:    amphetamine-dextroamphetamine (ADDERALL XR) 30 MG Oral Capsule SR 24 Hr    [facility] #[id number] [place], [place] - [address] [place] [address] [address] [place] [address] ([address]), [phone], [phone]    Please advise # [phone]

## 2022-02-02 NOTE — TELEPHONE ENCOUNTER
From: Marcia Claire  To: Tonny Ramos  Sent: 2/2/2022 11:41 AM CST  Subject: Appointment Reminder    Paul Jasso-    Dr. Maria Elena Bustamante has filled your medication for you, but she states you are due for an office visit prior to your next refill. If you could please give the office a call and get a visit set up that will ensure there is no delay on future refills.     Thank you  Marcia Claire RN    Staff of

## 2022-03-01 RX ORDER — DEXTROAMPHETAMINE SACCHARATE, AMPHETAMINE ASPARTATE MONOHYDRATE, DEXTROAMPHETAMINE SULFATE AND AMPHETAMINE SULFATE 7.5; 7.5; 7.5; 7.5 MG/1; MG/1; MG/1; MG/1
30 CAPSULE, EXTENDED RELEASE ORAL DAILY
Qty: 30 CAPSULE | Refills: 0 | Status: SHIPPED | OUTPATIENT
Start: 2022-03-01 | End: 2022-03-03

## 2022-03-01 NOTE — TELEPHONE ENCOUNTER
No refill protocol for this medication. Aderrall XR 30 mg:  Last refill: 2/02/2022 #30 with 0 refills    Last Visit: 6/07/2021   Next Visit:   Future Appointments   Date Time Provider Fahad Thomason   3/3/2022  1:45 PM Susan Ragsdale [facility]       Pt requesting 3 months of medication. Forward to Dr. Anmol Michele please advise on refills. Thanks.

## 2022-03-01 NOTE — TELEPHONE ENCOUNTER
amphetamine-dextroamphetamine (ADDERALL XR) 30 MG Oral Capsule SR 24 Hr    Pt would like 3 month supply sent in    PT WOULD LIKE REFILL SENT TO   [id number] - [place], [address] [place] [address] ([address]), [phone], [phone]

## 2022-03-03 ENCOUNTER — TELEMEDICINE (OUTPATIENT)
Dept: FAMILY MEDICINE CLINIC | Facility: CLINIC | Age: 27
End: 2022-03-03

## 2022-03-03 DIAGNOSIS — Z30.42 ENCOUNTER FOR SURVEILLANCE OF INJECTABLE CONTRACEPTIVE: Primary | ICD-10-CM

## 2022-03-03 DIAGNOSIS — F90.0 ATTENTION DEFICIT HYPERACTIVITY DISORDER (ADHD), PREDOMINANTLY INATTENTIVE TYPE: ICD-10-CM

## 2022-03-03 PROCEDURE — 99214 OFFICE O/P EST MOD 30 MIN: CPT | Performed by: FAMILY MEDICINE

## 2022-03-03 RX ORDER — DEXTROAMPHETAMINE SACCHARATE, AMPHETAMINE ASPARTATE MONOHYDRATE, DEXTROAMPHETAMINE SULFATE AND AMPHETAMINE SULFATE 7.5; 7.5; 7.5; 7.5 MG/1; MG/1; MG/1; MG/1
30 CAPSULE, EXTENDED RELEASE ORAL DAILY
Qty: 30 CAPSULE | Refills: 0 | Status: SHIPPED | OUTPATIENT
Start: 2022-05-01 | End: 2022-05-31

## 2022-03-03 RX ORDER — DEXTROAMPHETAMINE SACCHARATE, AMPHETAMINE ASPARTATE MONOHYDRATE, DEXTROAMPHETAMINE SULFATE AND AMPHETAMINE SULFATE 7.5; 7.5; 7.5; 7.5 MG/1; MG/1; MG/1; MG/1
30 CAPSULE, EXTENDED RELEASE ORAL DAILY
Qty: 30 CAPSULE | Refills: 0 | Status: SHIPPED | OUTPATIENT
Start: 2022-04-01 | End: 2022-05-01

## 2022-05-18 ENCOUNTER — TELEPHONE (OUTPATIENT)
Dept: FAMILY MEDICINE CLINIC | Facility: CLINIC | Age: 27
End: 2022-05-18

## 2022-05-18 NOTE — TELEPHONE ENCOUNTER
Pt has not been feeling well past few days - has had an Extreme sore throat. Positive for strep- chiropractor gave her a rapid test to take at home. Would like a prescription for amoxicillin sent to:      [facility] [address] [facility], [facility] [address]. AT [address] [facility] ([address]), [phone], [phone] [address] [facility]   Phone: [phone] Fax: [phone]     Please advise. Thank you!

## 2022-05-18 NOTE — TELEPHONE ENCOUNTER
This message was originally sent to Dr. Erika Gonzalez. Patient has called again regarding the first message. Wait for Dr. Erika Gonzalez to address?

## 2022-05-18 NOTE — TELEPHONE ENCOUNTER
For the sore throat patient can do salt water gargles, throat lozenges, Tylenol and/or Motrin for the discomfort  Dr. Samantha Cifuentes to address further on this issue tomorrow. Odd that a Chiropractor is doing strep tests.

## 2022-05-18 NOTE — TELEPHONE ENCOUNTER
Patient did not like that answer. She wishes she would have been told 4 hours ago that Dr. Katy Sterling was not in the office and she would have go to the [facility] clinic. But she states that she can't go now because both her kids are home. She doesn't want her 1year old to get strep. Her 1year old can not take most antibiotics The last time her child got sick the child ended up with scarlet fever. Patient states that the chiropractor ordered the test but they cannot prescribe the antibiotic.

## 2022-05-18 NOTE — TELEPHONE ENCOUNTER
This needs to be addressed tomorrow by Dr. Diana Trevino as she may want to evaluate her. HEr other option is to go to [facility]. I do not treat patients based on testing done by a Chiropractor.   Thanks

## 2022-05-18 NOTE — TELEPHONE ENCOUNTER
Patient just calling back following up from her call today. I informed Patient that message was sent to Dr Gretchen Guthrie and  will take care of this as soon as she can.

## 2022-05-19 ENCOUNTER — TELEMEDICINE (OUTPATIENT)
Dept: FAMILY MEDICINE CLINIC | Facility: CLINIC | Age: 27
End: 2022-05-19

## 2022-05-19 DIAGNOSIS — J02.0 STREP PHARYNGITIS: Primary | ICD-10-CM

## 2022-05-19 DIAGNOSIS — R05.9 COUGH: ICD-10-CM

## 2022-05-19 PROCEDURE — 99214 OFFICE O/P EST MOD 30 MIN: CPT | Performed by: FAMILY MEDICINE

## 2022-05-19 RX ORDER — IBUPROFEN 800 MG/1
800 TABLET ORAL EVERY 8 HOURS PRN
Qty: 30 TABLET | Refills: 0 | Status: SHIPPED | OUTPATIENT
Start: 2022-05-19 | End: 2023-05-14

## 2022-05-19 RX ORDER — BENZONATATE 200 MG/1
200 CAPSULE ORAL 3 TIMES DAILY PRN
Qty: 30 CAPSULE | Refills: 0 | Status: SHIPPED | OUTPATIENT
Start: 2022-05-19

## 2022-05-19 RX ORDER — AMOXICILLIN 500 MG/1
500 CAPSULE ORAL 2 TIMES DAILY
Qty: 20 CAPSULE | Refills: 0 | Status: SHIPPED | OUTPATIENT
Start: 2022-05-19 | End: 2022-05-29

## 2022-05-19 NOTE — TELEPHONE ENCOUNTER
Can you call her, did she go to [facility] last night and get treated? If not, then can she do VV with me today?

## 2022-05-19 NOTE — TELEPHONE ENCOUNTER
Patient notified via detailed voicemail left at cell number (ok per  HIPAA consent)  Asked to call back to schedule VV if she has not received treatment already

## 2022-06-03 DIAGNOSIS — F90.0 ATTENTION DEFICIT HYPERACTIVITY DISORDER (ADHD), PREDOMINANTLY INATTENTIVE TYPE: ICD-10-CM

## 2022-06-03 RX ORDER — DEXTROAMPHETAMINE SACCHARATE, AMPHETAMINE ASPARTATE MONOHYDRATE, DEXTROAMPHETAMINE SULFATE AND AMPHETAMINE SULFATE 7.5; 7.5; 7.5; 7.5 MG/1; MG/1; MG/1; MG/1
30 CAPSULE, EXTENDED RELEASE ORAL DAILY
Qty: 30 CAPSULE | Refills: 0 | Status: SHIPPED | OUTPATIENT
Start: 2022-06-03 | End: 2022-07-03

## 2022-06-03 NOTE — TELEPHONE ENCOUNTER
[facility] [address] [place] [address] [address] Demian Patel [address] [place] [address] ([address]), [phone], [phone]   [address]   Phone: [phone] Fax: [phone]     PATIENT IS ASKING FOR REFILL ON HER ADDERALL

## 2022-06-30 DIAGNOSIS — F90.0 ATTENTION DEFICIT HYPERACTIVITY DISORDER (ADHD), PREDOMINANTLY INATTENTIVE TYPE: ICD-10-CM

## 2022-06-30 RX ORDER — DEXTROAMPHETAMINE SACCHARATE, AMPHETAMINE ASPARTATE MONOHYDRATE, DEXTROAMPHETAMINE SULFATE AND AMPHETAMINE SULFATE 7.5; 7.5; 7.5; 7.5 MG/1; MG/1; MG/1; MG/1
30 CAPSULE, EXTENDED RELEASE ORAL DAILY
Qty: 30 CAPSULE | Refills: 0 | Status: SHIPPED | OUTPATIENT
Start: 2022-06-30 | End: 2022-07-30

## 2022-06-30 NOTE — TELEPHONE ENCOUNTER
PT CALLED AND ADV NEEDS REFILL OF         amphetamine-dextroamphetamine ER (ADDERALL XR) 30 MG Oral Capsule SR 24 Hr    PLEASE SEND TO [facility]      THANK YOU

## 2022-07-25 DIAGNOSIS — Z30.09 BIRTH CONTROL COUNSELING: ICD-10-CM

## 2022-07-25 DIAGNOSIS — F90.0 ATTENTION DEFICIT HYPERACTIVITY DISORDER (ADHD), PREDOMINANTLY INATTENTIVE TYPE: ICD-10-CM

## 2022-07-25 RX ORDER — MEDROXYPROGESTERONE ACETATE 150 MG/ML
150 INJECTION, SUSPENSION INTRAMUSCULAR
Qty: 1 EACH | Refills: 3 | Status: SHIPPED | OUTPATIENT
Start: 2022-07-25

## 2022-07-25 RX ORDER — DEXTROAMPHETAMINE SACCHARATE, AMPHETAMINE ASPARTATE MONOHYDRATE, DEXTROAMPHETAMINE SULFATE AND AMPHETAMINE SULFATE 7.5; 7.5; 7.5; 7.5 MG/1; MG/1; MG/1; MG/1
30 CAPSULE, EXTENDED RELEASE ORAL DAILY
Qty: 30 CAPSULE | Refills: 0 | Status: SHIPPED | OUTPATIENT
Start: 2022-07-25 | End: 2022-08-24

## 2022-07-25 NOTE — TELEPHONE ENCOUNTER
Last OV 5/19/22 telemed, 6/7/21 in office  Last refilled\"  6/30/22 adderall  #30  0 refills  8/28/21 medroxyprogesterone #1  3 refill

## 2022-08-08 ENCOUNTER — PATIENT MESSAGE (OUTPATIENT)
Dept: FAMILY MEDICINE CLINIC | Facility: CLINIC | Age: 27
End: 2022-08-08

## 2022-08-08 DIAGNOSIS — Z30.42 ENCOUNTER FOR SURVEILLANCE OF INJECTABLE CONTRACEPTIVE: Primary | ICD-10-CM

## 2022-08-08 RX ORDER — MEDROXYPROGESTERONE ACETATE 150 MG/ML
150 INJECTION, SUSPENSION INTRAMUSCULAR ONCE
Status: SHIPPED | OUTPATIENT
Start: 2022-08-07

## 2022-08-08 NOTE — TELEPHONE ENCOUNTER
Depo entered as patient reported/self administered on 8/7/22  Next depo due 10/30-11/6/22  Dates verified by INDERJIT Watt    Patient notified via [place]

## 2022-08-08 NOTE — TELEPHONE ENCOUNTER
From: Tonya Phillipsain  To: Adrienne Robertson DO  Sent: 8/8/2022 1:10 PM CDT  Subject: Depo    Just letting you know my mom did my depo shot last night 8-7-22. Can you please let me know dates for the next shot. Thank you!

## 2022-08-23 NOTE — TELEPHONE ENCOUNTER
Home Care Services    Need for Home Care Service was communicated by QUINTON Smith on 8/21/22.  Choice was offered and patient chose [facility].     Spoke with Ale regarding RN, PT, OT, HHA and MSW.   The patient will benefit from home care services based on recent hospitalization.     Risk of readmission is : high 81% (based on Longitudinal Plan of Care score)     Homebound criteria was discussed in detail and Ale verbalizes understanding.  Patient meets homebound criteria because weakness which limits endurance and increases risk for falls.     Patient does not have any new Durable Medical Equipment needs.       Verbal and written information  provided to patient along with [facility] contact information. Teach back demonstrated by patient.      Address, phone number, email, and insurance verified with patient as per [facility] records.       Patient's support system is family.    Active with a skilled home health agency prior to admission NO.   PCP verified with patient as Eladio Moreno.  Pt aware and agreeable that face coverings are to be worn by self and all household members during home care visits. YES      Liaison will continue to follow until discharge.   Anticipated dc date is 8/24/22.      Tari Fam, MSN, RN   Home Care Service Liaison  C:  [phone]  [email or web address]     LOV: 10/23/0 Telemed  Last Refill: filled yesterday to [facility]      Pt was advised- will call pharmacy to have them prepare script

## 2022-08-30 DIAGNOSIS — F90.0 ATTENTION DEFICIT HYPERACTIVITY DISORDER (ADHD), PREDOMINANTLY INATTENTIVE TYPE: ICD-10-CM

## 2022-08-30 RX ORDER — DEXTROAMPHETAMINE SACCHARATE, AMPHETAMINE ASPARTATE MONOHYDRATE, DEXTROAMPHETAMINE SULFATE AND AMPHETAMINE SULFATE 7.5; 7.5; 7.5; 7.5 MG/1; MG/1; MG/1; MG/1
30 CAPSULE, EXTENDED RELEASE ORAL DAILY
Qty: 30 CAPSULE | Refills: 0 | Status: SHIPPED | OUTPATIENT
Start: 2022-08-30 | End: 2022-09-29

## 2022-08-30 NOTE — TELEPHONE ENCOUNTER
[facility] [address] Marco [address] Foster [address] [place] [address] ([address]), [phone], [phone] [address]   Phone: [phone] Fax: [phone]     Patient asking for refill on her Adderall. Patient says the refill date is for tomorrow, but would like to get it before the weekend Holiday.

## 2022-09-29 DIAGNOSIS — F90.0 ATTENTION DEFICIT HYPERACTIVITY DISORDER (ADHD), PREDOMINANTLY INATTENTIVE TYPE: ICD-10-CM

## 2022-09-29 RX ORDER — DEXTROAMPHETAMINE SACCHARATE, AMPHETAMINE ASPARTATE MONOHYDRATE, DEXTROAMPHETAMINE SULFATE AND AMPHETAMINE SULFATE 7.5; 7.5; 7.5; 7.5 MG/1; MG/1; MG/1; MG/1
30 CAPSULE, EXTENDED RELEASE ORAL DAILY
Qty: 30 CAPSULE | Refills: 0 | Status: SHIPPED | OUTPATIENT
Start: 2022-09-29 | End: 2022-10-29

## 2022-09-29 NOTE — TELEPHONE ENCOUNTER
amphetamine-dextroamphetamine ER (ADDERALL XR) 30 MG Oral Capsule SR 24 Hr    Pt would like sent to   [place] [address] [address] Demian Patel [address] [place] [address] ([address]), [phone], [phone]

## 2022-10-27 DIAGNOSIS — F90.0 ATTENTION DEFICIT HYPERACTIVITY DISORDER (ADHD), PREDOMINANTLY INATTENTIVE TYPE: ICD-10-CM

## 2022-10-27 RX ORDER — DEXTROAMPHETAMINE SACCHARATE, AMPHETAMINE ASPARTATE MONOHYDRATE, DEXTROAMPHETAMINE SULFATE AND AMPHETAMINE SULFATE 7.5; 7.5; 7.5; 7.5 MG/1; MG/1; MG/1; MG/1
30 CAPSULE, EXTENDED RELEASE ORAL DAILY
Qty: 30 CAPSULE | Refills: 0 | Status: SHIPPED | OUTPATIENT
Start: 2022-10-27 | End: 2022-11-26

## 2022-10-27 NOTE — TELEPHONE ENCOUNTER
Pt called to schedule appt:     Future Appointments   Date Time Provider Fahad Thomason   11/21/2022  4:00 PM Jayla Glez [facility]

## 2022-10-27 NOTE — TELEPHONE ENCOUNTER
Last OV 5/19/22 telemed, 6/7/21 OV  Last refilled 9/29/22  #30  0 refills     Due for appointment. Can refill once scheduled. Patient notified via detailed voicemail left at cell number (ok per  HIPAA consent)  Asked to call office back to schedule IN OFFICE appointment.   Once scheduled refill will be sent

## 2022-11-01 ENCOUNTER — PATIENT MESSAGE (OUTPATIENT)
Dept: FAMILY MEDICINE CLINIC | Facility: CLINIC | Age: 27
End: 2022-11-01

## 2022-11-01 DIAGNOSIS — Z30.09 BIRTH CONTROL COUNSELING: ICD-10-CM

## 2022-11-01 RX ORDER — MEDROXYPROGESTERONE ACETATE 150 MG/ML
150 INJECTION, SUSPENSION INTRAMUSCULAR
Qty: 1 EACH | Refills: 3 | Status: SHIPPED | OUTPATIENT
Start: 2022-11-01

## 2022-11-01 NOTE — TELEPHONE ENCOUNTER
Medication pended with correct pharmacy  Last refilled 7/25/22 for 1 with 3 RF  LV with MEGHNA telemed 5/19/22  Future appt with MEGHNA 11/21/22

## 2022-11-01 NOTE — TELEPHONE ENCOUNTER
From: Talib Anderson  To: Urvashi Yen DO  Sent: 11/1/2022 7:53 AM CDT  Subject: Depo    Good morning, I need to refill my depo today so my mom can do my shot tomorrow, are you able to send that over to [facility] this morning?

## 2022-11-02 NOTE — TELEPHONE ENCOUNTER
From: Brian Disla  Sent: 11/1/2022 6:23 PM CDT  To: Jose Chun Clinical Staff  Subject: Depo    My mom is doing my depo tonight 11-1-22. Can you please let me know dates for the next one. Thank you!

## 2022-11-07 ENCOUNTER — PATIENT MESSAGE (OUTPATIENT)
Dept: FAMILY MEDICINE CLINIC | Facility: CLINIC | Age: 27
End: 2022-11-07

## 2022-11-07 DIAGNOSIS — R05.9 COUGH: ICD-10-CM

## 2022-11-07 RX ORDER — BENZONATATE 200 MG/1
200 CAPSULE ORAL 3 TIMES DAILY PRN
Qty: 30 CAPSULE | Refills: 0 | Status: SHIPPED | OUTPATIENT
Start: 2022-11-07

## 2022-11-07 NOTE — TELEPHONE ENCOUNTER
From: Maren Smith  To: Dennis Beyer DO  Sent: 11/7/2022 9:14 AM CST  Subject: Cough meds    Good morning, I took Cathi to urgent care Friday and she tested positive for Influenza A. My  and I both now have it and this cough is horrible. Nothing over the counter is helping at all. I used the rest of the benzonatate I had from covid and that helped some at bedtime. Is there anything else u can send over or even just refilling the benzonatate?

## 2022-11-28 ENCOUNTER — TELEPHONE (OUTPATIENT)
Dept: FAMILY MEDICINE CLINIC | Facility: CLINIC | Age: 27
End: 2022-11-28

## 2022-11-28 DIAGNOSIS — F90.0 ATTENTION DEFICIT HYPERACTIVITY DISORDER (ADHD), PREDOMINANTLY INATTENTIVE TYPE: ICD-10-CM

## 2022-11-28 RX ORDER — DEXTROAMPHETAMINE SACCHARATE, AMPHETAMINE ASPARTATE MONOHYDRATE, DEXTROAMPHETAMINE SULFATE AND AMPHETAMINE SULFATE 7.5; 7.5; 7.5; 7.5 MG/1; MG/1; MG/1; MG/1
30 CAPSULE, EXTENDED RELEASE ORAL DAILY
Qty: 30 CAPSULE | Refills: 0 | Status: SHIPPED | OUTPATIENT
Start: 2022-11-28 | End: 2022-12-28

## 2022-11-28 NOTE — TELEPHONE ENCOUNTER
PT CALLED AND ADV THAT PHARMACY DOES NOT HAVE MEDICATION.   CAN WE PLEASE RESEND IT TO     [facility] [address]    THANK YOU     amphetamine-dextroamphetamine ER (ADDERALL XR) 30 MG Oral Capsule SR 24 Hr

## 2022-11-28 NOTE — TELEPHONE ENCOUNTER
Last OV 11/21/22  Last refilled 11/21/22 to [facility]    Routed to  to advise on resending to [facility] 34/47

## 2022-12-21 ENCOUNTER — TELEPHONE (OUTPATIENT)
Dept: FAMILY MEDICINE CLINIC | Facility: CLINIC | Age: 27
End: 2022-12-21

## 2022-12-21 NOTE — TELEPHONE ENCOUNTER
Pt states that she needs adderall by 27th. Can you please send prescription on Saturday? Also can you please make sure that [facility]:      [facility] [address] Demian Patel [address] [place] [address] ([address]), [phone], [phone]   [facility]   Phone: [phone] Fax: [phone]       Thank you!

## 2022-12-23 ENCOUNTER — TELEPHONE (OUTPATIENT)
Dept: FAMILY MEDICINE CLINIC | Facility: CLINIC | Age: 27
End: 2022-12-23

## 2022-12-23 ENCOUNTER — PATIENT MESSAGE (OUTPATIENT)
Dept: FAMILY MEDICINE CLINIC | Facility: CLINIC | Age: 27
End: 2022-12-23

## 2022-12-23 NOTE — TELEPHONE ENCOUNTER
See encounter from yesterday    Patient states the meds is still saying next week    She thought dr had ok'd to fill early (yesterday)    Please adv  Thank you

## 2022-12-23 NOTE — TELEPHONE ENCOUNTER
From: Helga Ritchie  To: Magi Matthewpatricio, DO  Sent: 12/23/2022 10:26 AM CST  Subject: Refill    Hi dr Erika Gonzalez! I was just calling because I called Wednesday about getting my adderall refilled before Christmas in case Eduardaeens was out again, and the nurse called me back Wednesday and said an early refill was sent in so it could be filled on Thursday (yesterday) but I just called [facility] in [place] and they said they dont have any refills on file for me. They said they do have the adderall er 30 mg in stock today but most likely wont have it on Monday because they are still catching up from when it was back ordered. I am just trying to see if I can get that sent over to them so I can pick it up today, otherwise Monday I'll be scrambling trying to find somewhere else that has it in stock and I believe u guys are closed that day as well.

## 2022-12-23 NOTE — TELEPHONE ENCOUNTER
Per dr Reinoso [place], ok to refill early  Spoke with [address] [place] [address] staff and ok'd one time early refill    Patient notified and verbalized understanding.

## 2023-01-17 ENCOUNTER — TELEPHONE (OUTPATIENT)
Dept: FAMILY MEDICINE CLINIC | Facility: CLINIC | Age: 28
End: 2023-01-17

## 2023-01-17 NOTE — TELEPHONE ENCOUNTER
Letter mailed to patient reminding her she has outstanding orders.     Lab Frequency Next Occurrence   CBC WITH DIFFERENTIAL WITH PLATELET Once 39/74/7007   COMP METABOLIC PANEL (14) Once 60/70/3202   HEMOGLOBIN A1C Once 11/21/2022   LIPID PANEL Once 11/21/2022   TSH W REFLEX TO FREE T4 Once 11/21/2022

## 2023-01-23 ENCOUNTER — PATIENT MESSAGE (OUTPATIENT)
Dept: FAMILY MEDICINE CLINIC | Facility: CLINIC | Age: 28
End: 2023-01-23

## 2023-01-23 NOTE — TELEPHONE ENCOUNTER
From: Tracy Henderson  To: Nandini Chan DO  Sent: 1/23/2023 7:52 AM CST  Subject: Med refills    Good morning Dr Terrance Alvarenga, I was just wondering if you could please send a refill over today for my adderall and my depo shot as well to [facility] in [place]?

## 2023-01-26 ENCOUNTER — PATIENT MESSAGE (OUTPATIENT)
Dept: FAMILY MEDICINE CLINIC | Facility: CLINIC | Age: 28
End: 2023-01-26

## 2023-01-26 DIAGNOSIS — Z30.42 ENCOUNTER FOR SURVEILLANCE OF INJECTABLE CONTRACEPTIVE: Primary | ICD-10-CM

## 2023-01-27 RX ORDER — MEDROXYPROGESTERONE ACETATE 150 MG/ML
150 INJECTION, SUSPENSION INTRAMUSCULAR ONCE
Status: SHIPPED | OUTPATIENT
Start: 2023-01-26

## 2023-01-27 NOTE — TELEPHONE ENCOUNTER
From: Tres Felipe  To: Mateo Reed DO  Sent: 1/26/2023 9:25 PM CST  Subject: Depo    Hello, my mom did my depo shot today January 26th. Can you please let me know the date range for the next shot. Thank you!

## 2023-02-21 RX ORDER — DEXTROAMPHETAMINE SACCHARATE, AMPHETAMINE ASPARTATE MONOHYDRATE, DEXTROAMPHETAMINE SULFATE AND AMPHETAMINE SULFATE 7.5; 7.5; 7.5; 7.5 MG/1; MG/1; MG/1; MG/1
30 CAPSULE, EXTENDED RELEASE ORAL DAILY
Qty: 30 CAPSULE | Refills: 0 | Status: SHIPPED | OUTPATIENT
Start: 2023-04-24 | End: 2023-05-24

## 2023-02-21 RX ORDER — DEXTROAMPHETAMINE SACCHARATE, AMPHETAMINE ASPARTATE MONOHYDRATE, DEXTROAMPHETAMINE SULFATE AND AMPHETAMINE SULFATE 7.5; 7.5; 7.5; 7.5 MG/1; MG/1; MG/1; MG/1
30 CAPSULE, EXTENDED RELEASE ORAL DAILY
Qty: 30 CAPSULE | Refills: 0 | Status: SHIPPED | OUTPATIENT
Start: 2023-02-21 | End: 2023-03-23

## 2023-02-21 RX ORDER — DEXTROAMPHETAMINE SACCHARATE, AMPHETAMINE ASPARTATE MONOHYDRATE, DEXTROAMPHETAMINE SULFATE AND AMPHETAMINE SULFATE 7.5; 7.5; 7.5; 7.5 MG/1; MG/1; MG/1; MG/1
30 CAPSULE, EXTENDED RELEASE ORAL DAILY
Qty: 30 CAPSULE | Refills: 0 | Status: SHIPPED | OUTPATIENT
Start: 2023-03-24 | End: 2023-04-23

## 2023-02-21 NOTE — TELEPHONE ENCOUNTER
[facility] [address] Marco A Foster [address] [place] [address] ([address]), [phone], [phone] [address]   Phone: [phone] Fax: [phone]   Hours: Not open 24 hours     PATIENT IS REQUESTING REFILL FOR ADDERALL. SHE SAYS PHARMACY TOLD HER SHE HAS NO REFILLS LEFT.

## 2023-03-21 ENCOUNTER — TELEPHONE (OUTPATIENT)
Dept: FAMILY MEDICINE CLINIC | Facility: CLINIC | Age: 28
End: 2023-03-21

## 2023-03-21 NOTE — TELEPHONE ENCOUNTER
Pt called she is trying to figure out why is not able to  script for   amphetamine-dextroamphetamine ER (ADDERALL XR) 30 MG Oral Capsule SR 24 Hr    She said that she has 1 pill left and last months bottle says she picked up on the 21st.    [facility] but was told they could not fill it and they were runny low on that medication. Pt wants to know if the nurse can call over there so she can get it sooner?

## 2023-03-21 NOTE — TELEPHONE ENCOUNTER
Per pharmacy staff, 30 tabs dispensed 2/21/23 but since only 28 days in February not due for refill 3/23/23    Patient notified via detailed voicemail left at cell number (ok per  HIPAA consent)

## 2023-04-20 DIAGNOSIS — Z30.09 BIRTH CONTROL COUNSELING: ICD-10-CM

## 2023-04-20 RX ORDER — MEDROXYPROGESTERONE ACETATE 150 MG/ML
150 INJECTION, SUSPENSION INTRAMUSCULAR
Qty: 1 EACH | Refills: 3 | Status: CANCELLED | OUTPATIENT
Start: 2023-04-20

## 2023-04-22 RX ORDER — MEDROXYPROGESTERONE ACETATE 150 MG/ML
150 INJECTION, SUSPENSION INTRAMUSCULAR ONCE
Status: SHIPPED | OUTPATIENT
Start: 2023-04-21

## 2023-04-24 ENCOUNTER — TELEPHONE (OUTPATIENT)
Dept: FAMILY MEDICINE CLINIC | Facility: CLINIC | Age: 28
End: 2023-04-24

## 2023-04-24 RX ORDER — DEXTROAMPHETAMINE SACCHARATE, AMPHETAMINE ASPARTATE MONOHYDRATE, DEXTROAMPHETAMINE SULFATE AND AMPHETAMINE SULFATE 7.5; 7.5; 7.5; 7.5 MG/1; MG/1; MG/1; MG/1
30 CAPSULE, EXTENDED RELEASE ORAL DAILY
Qty: 30 CAPSULE | Refills: 0 | Status: SHIPPED | OUTPATIENT
Start: 2023-04-24 | End: 2023-05-24

## 2023-04-24 NOTE — TELEPHONE ENCOUNTER
PT CALLED AND [facility] IS OUT OF STOCK:    PLEASE RESEND TO     [facility] [address]     amphetamine-dextroamphetamine ER (ADDERALL XR) 30 MG Oral Capsule SR 24 Hr

## 2023-05-22 RX ORDER — DEXTROAMPHETAMINE SACCHARATE, AMPHETAMINE ASPARTATE MONOHYDRATE, DEXTROAMPHETAMINE SULFATE AND AMPHETAMINE SULFATE 7.5; 7.5; 7.5; 7.5 MG/1; MG/1; MG/1; MG/1
30 CAPSULE, EXTENDED RELEASE ORAL DAILY
Qty: 30 CAPSULE | Refills: 0 | Status: SHIPPED | OUTPATIENT
Start: 2023-05-22 | End: 2023-06-21

## 2023-05-22 NOTE — TELEPHONE ENCOUNTER
Patient requests refill    amphetamine-dextroamphetamine ER (ADDERALL XR) 30 MG Oral Capsule SR 24 Hr    Note pharmacy change due to stock      [facility] [address]

## 2023-06-16 ENCOUNTER — PATIENT MESSAGE (OUTPATIENT)
Dept: FAMILY MEDICINE CLINIC | Facility: CLINIC | Age: 28
End: 2023-06-16

## 2023-06-16 RX ORDER — DEXTROAMPHETAMINE SACCHARATE, AMPHETAMINE ASPARTATE MONOHYDRATE, DEXTROAMPHETAMINE SULFATE AND AMPHETAMINE SULFATE 7.5; 7.5; 7.5; 7.5 MG/1; MG/1; MG/1; MG/1
30 CAPSULE, EXTENDED RELEASE ORAL DAILY
Qty: 30 CAPSULE | Refills: 0 | Status: SHIPPED | OUTPATIENT
Start: 2023-06-16 | End: 2023-07-16

## 2023-06-16 NOTE — TELEPHONE ENCOUNTER
From: Mikael Gipson  To: Caio Rizzo, DO  Sent: 6/16/2023 8:51 AM CDT  Subject: Refill    Good morning, I was wondering if you are able to send my adderall to the [facility] on [address] from now on? Also, is it possible to refill on the for June? We are supposed to be going out of town for [facility] birthday on the 21st later in the day and my refill date is 6-22. Let me know, thank you so much!

## 2023-07-18 ENCOUNTER — PATIENT MESSAGE (OUTPATIENT)
Dept: FAMILY MEDICINE CLINIC | Facility: CLINIC | Age: 28
End: 2023-07-18

## 2023-07-18 DIAGNOSIS — Z30.42 ENCOUNTER FOR SURVEILLANCE OF INJECTABLE CONTRACEPTIVE: Primary | ICD-10-CM

## 2023-07-18 RX ORDER — MEDROXYPROGESTERONE ACETATE 150 MG/ML
150 INJECTION, SUSPENSION INTRAMUSCULAR ONCE
Status: SHIPPED | OUTPATIENT
Start: 2023-07-18

## 2023-07-18 NOTE — TELEPHONE ENCOUNTER
From: Robert Bass  To: John Doss DO  Sent: 7/18/2023 4:11 PM CDT  Subject: Depo    Hello, my mom did my shot today 7-18-23, can you please let me know the dates for the next one? Also, it shows I owe $136 for my med check visit, but when I spoke to the front office they were going to see if it coule be put in as something that would be covered under preventative so insurance covers it? Just wanted to see if they mentioned that to you or not. Thank you!

## 2023-07-19 NOTE — TELEPHONE ENCOUNTER
It wasn't scheduled as a physical and we didn't go over any preventative health care. She is also due to come in for a pap. I'm sorry if there was a miscommunication about the type of appointment it was.

## 2023-07-19 NOTE — TELEPHONE ENCOUNTER
Left pt to call office to provide new depo dates and schedule her pap.  I did mention nurse responded to recent Ludi labshart correspondence as well, but did not relay the specifics

## 2023-08-14 DIAGNOSIS — F90.0 ATTENTION DEFICIT HYPERACTIVITY DISORDER (ADHD), PREDOMINANTLY INATTENTIVE TYPE: ICD-10-CM

## 2023-08-14 RX ORDER — DEXTROAMPHETAMINE SACCHARATE, AMPHETAMINE ASPARTATE MONOHYDRATE, DEXTROAMPHETAMINE SULFATE AND AMPHETAMINE SULFATE 7.5; 7.5; 7.5; 7.5 MG/1; MG/1; MG/1; MG/1
30 CAPSULE, EXTENDED RELEASE ORAL EVERY MORNING
Qty: 30 CAPSULE | Refills: 0 | Status: SHIPPED | OUTPATIENT
Start: 2023-08-14 | End: 2023-09-13

## 2023-08-14 RX ORDER — DEXTROAMPHETAMINE SACCHARATE, AMPHETAMINE ASPARTATE, DEXTROAMPHETAMINE SULFATE AND AMPHETAMINE SULFATE 2.5; 2.5; 2.5; 2.5 MG/1; MG/1; MG/1; MG/1
10 TABLET ORAL DAILY PRN
Qty: 30 TABLET | Refills: 0 | Status: SHIPPED | OUTPATIENT
Start: 2023-08-14

## 2023-09-13 DIAGNOSIS — F90.0 ATTENTION DEFICIT HYPERACTIVITY DISORDER (ADHD), PREDOMINANTLY INATTENTIVE TYPE: ICD-10-CM

## 2023-09-14 RX ORDER — DEXTROAMPHETAMINE SACCHARATE, AMPHETAMINE ASPARTATE, DEXTROAMPHETAMINE SULFATE AND AMPHETAMINE SULFATE 2.5; 2.5; 2.5; 2.5 MG/1; MG/1; MG/1; MG/1
10 TABLET ORAL DAILY PRN
Qty: 30 TABLET | Refills: 0 | Status: SHIPPED | OUTPATIENT
Start: 2023-09-14

## 2023-09-20 DIAGNOSIS — F90.0 ATTENTION DEFICIT HYPERACTIVITY DISORDER (ADHD), PREDOMINANTLY INATTENTIVE TYPE: ICD-10-CM

## 2023-09-20 NOTE — TELEPHONE ENCOUNTER
Pt would like the following refilled. Can she please have 3 months worth? Please advise. Thank you!     amphetamine-dextroamphetamine ER (ADDERALL XR) 30 MG Oral Capsule SR 24 Hr [[id number]] (Order [id number])      [facility] #[id number] - [place] - [address] AT [facility] OF [address], [phone], [phone]   [address] [address]   Phone: [phone] Fax: [phone]

## 2023-09-21 RX ORDER — DEXTROAMPHETAMINE SACCHARATE, AMPHETAMINE ASPARTATE MONOHYDRATE, DEXTROAMPHETAMINE SULFATE AND AMPHETAMINE SULFATE 7.5; 7.5; 7.5; 7.5 MG/1; MG/1; MG/1; MG/1
30 CAPSULE, EXTENDED RELEASE ORAL EVERY MORNING
Qty: 30 CAPSULE | Refills: 0 | Status: SHIPPED | OUTPATIENT
Start: 2023-09-21 | End: 2023-10-21

## 2023-10-12 ENCOUNTER — PATIENT MESSAGE (OUTPATIENT)
Dept: FAMILY MEDICINE CLINIC | Facility: CLINIC | Age: 28
End: 2023-10-12

## 2023-10-12 DIAGNOSIS — Z30.42 ENCOUNTER FOR SURVEILLANCE OF INJECTABLE CONTRACEPTIVE: Primary | ICD-10-CM

## 2023-10-12 DIAGNOSIS — F90.0 ATTENTION DEFICIT HYPERACTIVITY DISORDER (ADHD), PREDOMINANTLY INATTENTIVE TYPE: ICD-10-CM

## 2023-10-12 RX ORDER — DEXTROAMPHETAMINE SACCHARATE, AMPHETAMINE ASPARTATE, DEXTROAMPHETAMINE SULFATE AND AMPHETAMINE SULFATE 2.5; 2.5; 2.5; 2.5 MG/1; MG/1; MG/1; MG/1
10 TABLET ORAL DAILY PRN
Qty: 30 TABLET | Refills: 0 | Status: SHIPPED | OUTPATIENT
Start: 2023-10-12

## 2023-10-12 RX ORDER — MEDROXYPROGESTERONE ACETATE 150 MG/ML
150 INJECTION, SUSPENSION INTRAMUSCULAR ONCE
Status: SHIPPED | OUTPATIENT
Start: 2023-10-12

## 2023-10-12 NOTE — TELEPHONE ENCOUNTER
Routing to provider per protocol. amphetamine-dextroamphetamine 10 MG Oral Tab   Last refilled on 9/14/23 for #30  with 0 rf. Last labs 8/1/2018. Last seen on 7/11/23. No future appointments. Thank you.

## 2023-10-12 NOTE — TELEPHONE ENCOUNTER
From: Gabriella Garcia  To: Urvashi Yen  Sent: 10/12/2023 2:36 PM CDT  Subject: Depo    Hello, my mom did my depo shot today 10-12-24. Can you please let me know the dates for the next shot? Also, i sent over a refill request this morning for my 10mg adeerall.

## 2023-10-20 DIAGNOSIS — F90.0 ATTENTION DEFICIT HYPERACTIVITY DISORDER (ADHD), PREDOMINANTLY INATTENTIVE TYPE: ICD-10-CM

## 2023-10-20 RX ORDER — DEXTROAMPHETAMINE SACCHARATE, AMPHETAMINE ASPARTATE MONOHYDRATE, DEXTROAMPHETAMINE SULFATE AND AMPHETAMINE SULFATE 7.5; 7.5; 7.5; 7.5 MG/1; MG/1; MG/1; MG/1
30 CAPSULE, EXTENDED RELEASE ORAL EVERY MORNING
Qty: 30 CAPSULE | Refills: 0 | Status: SHIPPED | OUTPATIENT
Start: 2023-10-20 | End: 2023-11-19

## 2023-10-20 NOTE — TELEPHONE ENCOUNTER
Pt failed refill protocol for the following reasons:   amphetamine-dextroamphetamine ER (ADDERALL XR) 30 MG Oral Capsule SR 24 Hr          Possible duplicate: Hover to review recent actions on this medication    Sig: Take 1 capsule (30 mg total) by mouth every morning. Disp: 30 capsule    Refills: 0    Start: 10/20/2023 - 11/19/2023    Earliest Fill Date: 10/20/2023    Class: Normal    Non-formulary For: Attention deficit hyperactivity disorder (ADHD), predominantly inattentive type    Last ordered: 4 weeks ago (9/21/2023) by Ayde Colón, DO       To be filled at: None [Patient requested: [facility] [address] [facility]               Last refill: 10/12/23  Last appt: 7/11/23  Next appt: No future appointments. Forward to Dr. Terrance Alvarenga, please advise on refills. Thank you.

## 2023-11-12 DIAGNOSIS — F90.0 ATTENTION DEFICIT HYPERACTIVITY DISORDER (ADHD), PREDOMINANTLY INATTENTIVE TYPE: ICD-10-CM

## 2023-11-13 RX ORDER — DEXTROAMPHETAMINE SACCHARATE, AMPHETAMINE ASPARTATE, DEXTROAMPHETAMINE SULFATE AND AMPHETAMINE SULFATE 2.5; 2.5; 2.5; 2.5 MG/1; MG/1; MG/1; MG/1
10 TABLET ORAL DAILY PRN
Qty: 30 TABLET | Refills: 0 | Status: SHIPPED | OUTPATIENT
Start: 2023-11-13

## 2023-11-15 ENCOUNTER — OFFICE VISIT (OUTPATIENT)
Dept: FAMILY MEDICINE CLINIC | Facility: CLINIC | Age: 28
End: 2023-11-15
Payer: COMMERCIAL

## 2023-11-15 VITALS
TEMPERATURE: 97 F | HEIGHT: 67 IN | BODY MASS INDEX: 40.81 KG/M2 | HEART RATE: 83 BPM | DIASTOLIC BLOOD PRESSURE: 84 MMHG | WEIGHT: 260 LBS | SYSTOLIC BLOOD PRESSURE: 132 MMHG | RESPIRATION RATE: 18 BRPM | OXYGEN SATURATION: 98 %

## 2023-11-15 DIAGNOSIS — J40 BRONCHITIS: Primary | ICD-10-CM

## 2023-11-15 PROCEDURE — 99213 OFFICE O/P EST LOW 20 MIN: CPT | Performed by: PHYSICIAN ASSISTANT

## 2023-11-15 PROCEDURE — 3008F BODY MASS INDEX DOCD: CPT | Performed by: PHYSICIAN ASSISTANT

## 2023-11-15 PROCEDURE — 3075F SYST BP GE 130 - 139MM HG: CPT | Performed by: PHYSICIAN ASSISTANT

## 2023-11-15 PROCEDURE — 3079F DIAST BP 80-89 MM HG: CPT | Performed by: PHYSICIAN ASSISTANT

## 2023-11-15 RX ORDER — PREDNISONE 20 MG/1
TABLET ORAL
Qty: 10 TABLET | Refills: 0 | Status: SHIPPED | OUTPATIENT
Start: 2023-11-15

## 2023-11-15 RX ORDER — BENZONATATE 200 MG/1
200 CAPSULE ORAL 3 TIMES DAILY PRN
Qty: 30 CAPSULE | Refills: 0 | Status: SHIPPED | OUTPATIENT
Start: 2023-11-15

## 2023-11-15 RX ORDER — ALBUTEROL SULFATE 90 UG/1
2 AEROSOL, METERED RESPIRATORY (INHALATION) EVERY 4 HOURS PRN
Qty: 1 EACH | Refills: 0 | Status: SHIPPED | OUTPATIENT
Start: 2023-11-15

## 2023-11-20 ENCOUNTER — PATIENT MESSAGE (OUTPATIENT)
Dept: FAMILY MEDICINE CLINIC | Facility: CLINIC | Age: 28
End: 2023-11-20

## 2023-11-20 RX ORDER — DEXTROAMPHETAMINE SACCHARATE, AMPHETAMINE ASPARTATE MONOHYDRATE, DEXTROAMPHETAMINE SULFATE AND AMPHETAMINE SULFATE 7.5; 7.5; 7.5; 7.5 MG/1; MG/1; MG/1; MG/1
30 CAPSULE, EXTENDED RELEASE ORAL DAILY
Qty: 30 CAPSULE | Refills: 0 | Status: SHIPPED | OUTPATIENT
Start: 2023-12-21 | End: 2024-01-20

## 2023-11-20 RX ORDER — DEXTROAMPHETAMINE SACCHARATE, AMPHETAMINE ASPARTATE MONOHYDRATE, DEXTROAMPHETAMINE SULFATE AND AMPHETAMINE SULFATE 7.5; 7.5; 7.5; 7.5 MG/1; MG/1; MG/1; MG/1
30 CAPSULE, EXTENDED RELEASE ORAL DAILY
Qty: 30 CAPSULE | Refills: 0 | Status: SHIPPED | OUTPATIENT
Start: 2024-01-21 | End: 2024-02-20

## 2023-11-20 RX ORDER — DEXTROAMPHETAMINE SACCHARATE, AMPHETAMINE ASPARTATE MONOHYDRATE, DEXTROAMPHETAMINE SULFATE AND AMPHETAMINE SULFATE 7.5; 7.5; 7.5; 7.5 MG/1; MG/1; MG/1; MG/1
30 CAPSULE, EXTENDED RELEASE ORAL DAILY
Qty: 30 CAPSULE | Refills: 0 | Status: SHIPPED | OUTPATIENT
Start: 2023-11-20 | End: 2023-12-20

## 2023-11-20 NOTE — TELEPHONE ENCOUNTER
Last OV 7/11/23  Last refilled 10/20/23  #30  0 refill    90 day panel pended for review and approval if appropriate

## 2023-11-20 NOTE — TELEPHONE ENCOUNTER
From: Gabriella Garcia  To: Urvashi Yen  Sent: 11/20/2023 9:36 AM CST  Subject: Refill    Hi Dr Aleksander Lay, I just need to refill my 30mg XR adderall, but for some reason it isnt showing up in my list of meds to refill? Can you please send it to the [facility] in [place]?  Also, are you able to send over 3 months at a time like you used to be able to?

## 2023-11-20 NOTE — TELEPHONE ENCOUNTER
I can try sending over 3 months. We stopped when pharmacies didn't have the meds regularly, [place] we'd end up canceling them. We can see how it goes.

## 2023-11-22 ENCOUNTER — TELEPHONE (OUTPATIENT)
Dept: FAMILY MEDICINE CLINIC | Facility: CLINIC | Age: 28
End: 2023-11-22

## 2023-11-22 RX ORDER — PREDNISONE 20 MG/1
TABLET ORAL
Qty: 10 TABLET | Refills: 0 | Status: SHIPPED | OUTPATIENT
Start: 2023-11-22

## 2023-11-22 RX ORDER — PREDNISONE 20 MG/1
TABLET ORAL
Qty: 10 TABLET | Refills: 0 | Status: SHIPPED | OUTPATIENT
Start: 2023-11-22 | End: 2023-11-22

## 2023-11-22 NOTE — TELEPHONE ENCOUNTER
Please let patient or care giver know or leave message that refills have been sent to [facility] [address]    Thanks

## 2023-11-22 NOTE — TELEPHONE ENCOUNTER
Patient's name and  verified     Patient was wondering if we can get a refill of the prednisone. Patient has the nasty cough and felt better on the prednisone.    [facility] [address]  Patient notified and verbalized an understanding

## 2023-11-22 NOTE — TELEPHONE ENCOUNTER
I have no openings  If she is not doing well then she should head back to the UC for evaluation.   Thanks

## 2023-11-22 NOTE — TELEPHONE ENCOUNTER
Pt reports went to  last Wednesday - Dx bronchitis    Cough for over 2 weeks now  Back and forth to productive and dry cough    Can feel crackles after exhaling  No wheezing  No SOB, MELVA  No fevers     Coughing up Green phlegm -  concerned for bacterial? Needs abx?     No congestion, no stuffy nose  Rx cough medicine - has not helped   Completed steroids    Tightness to chest is improved  But cough - not better    OTC extra strength tylenol  Used mom's nebulizer - did help    Looking for recommendations    Advised pt that Dr. Anmol Michele not in office today, will have covering provider address - she v/u    Please advise, thank you

## 2023-11-22 NOTE — TELEPHONE ENCOUNTER
Pt has cough, went to urgent care last wed, prescribed medicine and finished it, should she still be coughing? sometimes she hears a crackly sound

## 2023-11-27 ENCOUNTER — TELEPHONE (OUTPATIENT)
Dept: FAMILY MEDICINE CLINIC | Facility: CLINIC | Age: 28
End: 2023-11-27

## 2023-11-27 ENCOUNTER — HOSPITAL ENCOUNTER (OUTPATIENT)
Age: 28
Discharge: HOME | End: 2023-11-27
Payer: COMMERCIAL

## 2023-11-27 ENCOUNTER — APPOINTMENT (OUTPATIENT)
Dept: GENERAL RADIOLOGY | Age: 28
End: 2023-11-27
Attending: PHYSICIAN ASSISTANT
Payer: COMMERCIAL

## 2023-11-27 VITALS
OXYGEN SATURATION: 96 % | DIASTOLIC BLOOD PRESSURE: 97 MMHG | HEIGHT: 67 IN | HEART RATE: 112 BPM | BODY MASS INDEX: 40.81 KG/M2 | TEMPERATURE: 97 F | RESPIRATION RATE: 18 BRPM | SYSTOLIC BLOOD PRESSURE: 152 MMHG | WEIGHT: 260 LBS

## 2023-11-27 DIAGNOSIS — J98.01 BRONCHOSPASM: ICD-10-CM

## 2023-11-27 DIAGNOSIS — R05.9 COUGH: Primary | ICD-10-CM

## 2023-11-27 PROCEDURE — 71046 X-RAY EXAM CHEST 2 VIEWS: CPT | Performed by: PHYSICIAN ASSISTANT

## 2023-11-27 PROCEDURE — 99213 OFFICE O/P EST LOW 20 MIN: CPT | Performed by: PHYSICIAN ASSISTANT

## 2023-11-27 RX ORDER — ALBUTEROL SULFATE 2.5 MG/3ML
2.5 SOLUTION RESPIRATORY (INHALATION) EVERY 4 HOURS PRN
Qty: 30 EACH | Refills: 0 | Status: SHIPPED | OUTPATIENT
Start: 2023-11-27 | End: 2023-12-27

## 2023-11-27 RX ORDER — AMOXICILLIN AND CLAVULANATE POTASSIUM 875; 125 MG/1; MG/1
1 TABLET, FILM COATED ORAL 2 TIMES DAILY
Qty: 20 TABLET | Refills: 0 | Status: SHIPPED | OUTPATIENT
Start: 2023-11-27 | End: 2023-12-07

## 2023-11-27 RX ORDER — CODEINE PHOSPHATE AND GUAIFENESIN 10; 100 MG/5ML; MG/5ML
5 SOLUTION ORAL EVERY 6 HOURS PRN
Qty: 180 ML | Refills: 0 | Status: SHIPPED | OUTPATIENT
Start: 2023-11-27

## 2023-11-27 NOTE — DISCHARGE INSTRUCTIONS
Continue new inhaler, Trelegy, rinse her mouth after using. New cough suppressant. Continue regular albuterol treatments every 3-4 hours. Push clear fluids. Do not drive while taking Cheratussin. If you develop fever, night sweats or other worsening initiate antibiotic. Avoid if improving.

## 2023-11-27 NOTE — ED INITIAL ASSESSMENT (HPI)
Cough x 3 weeks. Went to urgent care 2 weeks ago given an inhaler and prednisone. States it did not get better. Gave her another 5 days of steroids, pt now has a headache. No runny nose or congestion. Became productive.

## 2023-11-27 NOTE — TELEPHONE ENCOUNTER
Spoke with patient who states she has been to UC and given steroid, is going back due to not feeling better. States it is only $15 if she goes there, costs more to come to our office.   States if KE thinks she should be seen here instead she will come in    Routed to KE to advise (would need work in)

## 2023-11-27 NOTE — TELEPHONE ENCOUNTER
Pt sick for last 3 weeks, not getting any better with her cough.  Pt states she is going to  for eval and x-rays as it is cheaper with her ins

## 2023-12-12 DIAGNOSIS — F90.0 ATTENTION DEFICIT HYPERACTIVITY DISORDER (ADHD), PREDOMINANTLY INATTENTIVE TYPE: ICD-10-CM

## 2023-12-12 RX ORDER — DEXTROAMPHETAMINE SACCHARATE, AMPHETAMINE ASPARTATE, DEXTROAMPHETAMINE SULFATE AND AMPHETAMINE SULFATE 2.5; 2.5; 2.5; 2.5 MG/1; MG/1; MG/1; MG/1
10 TABLET ORAL DAILY PRN
Qty: 30 TABLET | Refills: 0 | Status: CANCELLED | OUTPATIENT
Start: 2023-12-12

## 2023-12-12 NOTE — TELEPHONE ENCOUNTER
Patient requesting for a referral for Amphetamine request. Called Patient and just let them know that Dr. Jayesh Coleman has already put in a 3 month prescription for her on November 20th. And for her to contact the [facility].

## 2023-12-18 ENCOUNTER — PATIENT MESSAGE (OUTPATIENT)
Dept: FAMILY MEDICINE CLINIC | Facility: CLINIC | Age: 28
End: 2023-12-18

## 2023-12-18 DIAGNOSIS — F90.0 ATTENTION DEFICIT HYPERACTIVITY DISORDER (ADHD), PREDOMINANTLY INATTENTIVE TYPE: ICD-10-CM

## 2023-12-18 RX ORDER — DEXTROAMPHETAMINE SACCHARATE, AMPHETAMINE ASPARTATE, DEXTROAMPHETAMINE SULFATE AND AMPHETAMINE SULFATE 2.5; 2.5; 2.5; 2.5 MG/1; MG/1; MG/1; MG/1
10 TABLET ORAL DAILY PRN
Qty: 30 TABLET | Refills: 0 | Status: SHIPPED | OUTPATIENT
Start: 2023-12-18

## 2023-12-18 NOTE — TELEPHONE ENCOUNTER
From: Joe Bauman  To: Tanna Josue  Sent: 12/18/2023 7:59 AM CST  Subject: Adderall    Good morning, [facility] [place] still hasn't been able to fill my adderall because they have been out all week. Is it possible to send the 10 mg refill to the [facility] [place] on [address] please? Thank you!

## 2023-12-20 DIAGNOSIS — Z30.09 BIRTH CONTROL COUNSELING: ICD-10-CM

## 2023-12-21 RX ORDER — MEDROXYPROGESTERONE ACETATE 150 MG/ML
1 INJECTION, SUSPENSION INTRAMUSCULAR
Qty: 1 ML | Refills: 0 | Status: SHIPPED | OUTPATIENT
Start: 2023-12-21

## 2023-12-21 NOTE — TELEPHONE ENCOUNTER
Pt failed refill protocol for the following reasons:   Name from pharmacy: MEDROXYPROGESTERONE 150MG/ML PF SYR         Will file in chart as: MEDROXYPROGESTERONE ACETATE 150 MG/ML Intramuscular Suspension Prefilled Syringe    The original prescription was discontinued on 15/14/6952 by Margaret Portillo RN for the following reason: Reorder. Renewing this prescription may not be appropriate. Sig: ADMINISTER 1 ML IN THE MUSCLE EVERY 3 MONTHS    Disp: 1 mL    Refills: 0 (Pharmacy requested: Not specified)    Start: 12/20/2023    Class: Normal    Non-formulary For: Birth control counseling    Last ordered: 1 year ago (11/1/2022) by Ayde Colón DO    Last refill: 7/17/2023    Rx #: [id number]       To be filled at: [address] ([address]), [phone], [phone]         Last refill: 10/12/23  Last appt: 7/11/23  Next appt: No future appointments. Forward to Dr. Terrance Alvarenga, please advise on refills. Thank you.

## 2023-12-29 ENCOUNTER — PATIENT MESSAGE (OUTPATIENT)
Dept: FAMILY MEDICINE CLINIC | Facility: CLINIC | Age: 28
End: 2023-12-29

## 2023-12-29 DIAGNOSIS — E66.01 MORBID OBESITY DUE TO EXCESS CALORIES (HCC): Primary | ICD-10-CM

## 2023-12-29 NOTE — TELEPHONE ENCOUNTER
From: Hardik Nguyen  To: Nandini Orn  Sent: 12/29/2023 9:14 AM CST  Subject: [place] morning, I was wondering if it would be possible to get a prescription for Zeobound to try for weightloss? I have a friend who has been on it for a few months and has great results. I just started going back to the gym and eating better and was hoping with the Zepbound I would have better results. I have done a lot of research into how it works/possible side effects, and I think combined with diet/exercise that it is something that will be very beneficial to me. I was going to reach out next week, but I forgot our insurance changes on the 1st so was hoping to try and do a month and see how it goes and then I'll have to figure out how it works with the new insurance after that.

## 2024-01-04 DIAGNOSIS — Z30.09 BIRTH CONTROL COUNSELING: ICD-10-CM

## 2024-01-04 RX ORDER — MEDROXYPROGESTERONE ACETATE 150 MG/ML
1 INJECTION, SUSPENSION INTRAMUSCULAR
Qty: 1 ML | Refills: 0 | Status: SHIPPED | OUTPATIENT
Start: 2024-01-04

## 2024-01-04 NOTE — TELEPHONE ENCOUNTER
No refill protocol for this medication.    Last refill: 12/21/2023 #1 WITH 0 REFILLS  Last Visit: 7/11/2023  Next Visit:   Future Appointments   Date Time Provider Department Center   1/19/2024  1:00 PM Betty Magaña DO [facility] [place]         Forward to Dr. Magaña please advise on refills. Thanks.

## 2024-01-19 ENCOUNTER — OFFICE VISIT (OUTPATIENT)
Dept: FAMILY MEDICINE CLINIC | Facility: CLINIC | Age: 29
End: 2024-01-19
Payer: COMMERCIAL

## 2024-01-19 VITALS
SYSTOLIC BLOOD PRESSURE: 136 MMHG | TEMPERATURE: 98 F | BODY MASS INDEX: 42 KG/M2 | DIASTOLIC BLOOD PRESSURE: 86 MMHG | RESPIRATION RATE: 18 BRPM | OXYGEN SATURATION: 98 % | WEIGHT: 269 LBS | HEART RATE: 87 BPM

## 2024-01-19 DIAGNOSIS — F90.0 ATTENTION DEFICIT HYPERACTIVITY DISORDER (ADHD), PREDOMINANTLY INATTENTIVE TYPE: ICD-10-CM

## 2024-01-19 DIAGNOSIS — Z12.4 SCREENING FOR CERVICAL CANCER: ICD-10-CM

## 2024-01-19 DIAGNOSIS — Z01.419 WELL WOMAN EXAM WITH ROUTINE GYNECOLOGICAL EXAM: Primary | ICD-10-CM

## 2024-01-19 DIAGNOSIS — Z00.00 HEALTHY ADULT ON ROUTINE PHYSICAL EXAMINATION: ICD-10-CM

## 2024-01-19 DIAGNOSIS — E66.01 CLASS 3 SEVERE OBESITY WITH BODY MASS INDEX (BMI) OF 40.0 TO 44.9 IN ADULT, UNSPECIFIED OBESITY TYPE, UNSPECIFIED WHETHER SERIOUS COMORBIDITY PRESENT (HCC): ICD-10-CM

## 2024-01-19 DIAGNOSIS — G47.00 INSOMNIA, UNSPECIFIED TYPE: ICD-10-CM

## 2024-01-19 LAB
ALBUMIN SERPL-MCNC: 4.2 G/DL (ref 3.4–5)
ALBUMIN/GLOB SERPL: 1.2 {RATIO} (ref 1–2)
ALP LIVER SERPL-CCNC: 68 U/L
ANION GAP SERPL CALC-SCNC: 6 MMOL/L (ref 0–18)
AST SERPL-CCNC: 9 U/L (ref 15–37)
BASOPHILS # BLD AUTO: 0.03 X10(3) UL (ref 0–0.2)
BASOPHILS NFR BLD AUTO: 0.5 %
BILIRUB SERPL-MCNC: 0.5 MG/DL (ref 0.1–2)
BUN BLD-MCNC: 12 MG/DL (ref 9–23)
CALCIUM BLD-MCNC: 9 MG/DL (ref 8.5–10.1)
CHLORIDE SERPL-SCNC: 108 MMOL/L (ref 98–112)
CHOLEST SERPL-MCNC: 299 MG/DL (ref ?–200)
CO2 SERPL-SCNC: 23 MMOL/L (ref 21–32)
CREAT BLD-MCNC: 1.03 MG/DL
EGFRCR SERPLBLD CKD-EPI 2021: 75 ML/MIN/1.73M2 (ref 60–?)
EOSINOPHIL # BLD AUTO: 0.07 X10(3) UL (ref 0–0.7)
EOSINOPHIL NFR BLD AUTO: 1.1 %
ERYTHROCYTE [DISTWIDTH] IN BLOOD BY AUTOMATED COUNT: 13.2 %
FASTING PATIENT LIPID ANSWER: YES
FASTING STATUS PATIENT QL REPORTED: YES
GLOBULIN PLAS-MCNC: 3.6 G/DL (ref 2.8–4.4)
GLUCOSE BLD-MCNC: 87 MG/DL (ref 70–99)
HCT VFR BLD AUTO: 43 %
HDLC SERPL-MCNC: 44 MG/DL (ref 40–59)
HGB BLD-MCNC: 14.7 G/DL
IMM GRANULOCYTES # BLD AUTO: 0.02 X10(3) UL (ref 0–1)
IMM GRANULOCYTES NFR BLD: 0.3 %
LDLC SERPL CALC-MCNC: 241 MG/DL (ref ?–100)
LYMPHOCYTES # BLD AUTO: 2.24 X10(3) UL (ref 1–4)
LYMPHOCYTES NFR BLD AUTO: 34.7 %
MCH RBC QN AUTO: 29.7 PG (ref 26–34)
MCHC RBC AUTO-ENTMCNC: 34.2 G/DL (ref 31–37)
MCV RBC AUTO: 86.9 FL
MONOCYTES # BLD AUTO: 0.49 X10(3) UL (ref 0.1–1)
MONOCYTES NFR BLD AUTO: 7.6 %
NEUTROPHILS # BLD AUTO: 3.6 X10 (3) UL (ref 1.5–7.7)
NEUTROPHILS # BLD AUTO: 3.6 X10(3) UL (ref 1.5–7.7)
NEUTROPHILS NFR BLD AUTO: 55.8 %
NONHDLC SERPL-MCNC: 255 MG/DL (ref ?–130)
OSMOLALITY SERPL CALC.SUM OF ELEC: 283 MOSM/KG (ref 275–295)
PLATELET # BLD AUTO: 257 10(3)UL (ref 150–450)
POTASSIUM SERPL-SCNC: 3.8 MMOL/L (ref 3.5–5.1)
PROT SERPL-MCNC: 7.8 G/DL (ref 6.4–8.2)
RBC # BLD AUTO: 4.95 X10(6)UL
SODIUM SERPL-SCNC: 137 MMOL/L (ref 136–145)
TRIGL SERPL-MCNC: 84 MG/DL (ref 30–149)
TSI SER-ACNC: 1.69 MIU/ML (ref 0.36–3.74)
VLDLC SERPL CALC-MCNC: 19 MG/DL (ref 0–30)
WBC # BLD AUTO: 6.5 X10(3) UL (ref 4–11)

## 2024-01-19 PROCEDURE — 99395 PREV VISIT EST AGE 18-39: CPT | Performed by: FAMILY MEDICINE

## 2024-01-19 PROCEDURE — 80053 COMPREHEN METABOLIC PANEL: CPT | Performed by: FAMILY MEDICINE

## 2024-01-19 PROCEDURE — 80061 LIPID PANEL: CPT | Performed by: FAMILY MEDICINE

## 2024-01-19 PROCEDURE — 88175 CYTOPATH C/V AUTO FLUID REDO: CPT | Performed by: FAMILY MEDICINE

## 2024-01-19 PROCEDURE — 87624 HPV HI-RISK TYP POOLED RSLT: CPT | Performed by: FAMILY MEDICINE

## 2024-01-19 PROCEDURE — 3079F DIAST BP 80-89 MM HG: CPT | Performed by: FAMILY MEDICINE

## 2024-01-19 PROCEDURE — 85025 COMPLETE CBC W/AUTO DIFF WBC: CPT | Performed by: FAMILY MEDICINE

## 2024-01-19 PROCEDURE — 83036 HEMOGLOBIN GLYCOSYLATED A1C: CPT | Performed by: FAMILY MEDICINE

## 2024-01-19 PROCEDURE — 84443 ASSAY THYROID STIM HORMONE: CPT | Performed by: FAMILY MEDICINE

## 2024-01-19 PROCEDURE — 3075F SYST BP GE 130 - 139MM HG: CPT | Performed by: FAMILY MEDICINE

## 2024-01-19 RX ORDER — TRAZODONE HYDROCHLORIDE 50 MG/1
50 TABLET ORAL NIGHTLY PRN
Qty: 30 TABLET | Refills: 0 | Status: SHIPPED | OUTPATIENT
Start: 2024-01-19

## 2024-01-19 NOTE — PROGRESS NOTES
HPI:   Nakia Lang is a 28 year old female who presents for a complete physical exam. Symptoms: denies discharge, itching, burning or dysuria, no periods since she is on depo shots . Patient complains of weight gain.     Has struggled with weight loss. Got down to 240, but could not sustain that. Currently eating 1700 arjun/day. Doing moderate carbs, fat. No longer doing strict keto.    Lost about 8-9 lbs since new years with getting back on track.   Using a carb manager to track.   Using her gym membership more, trying to move more.   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   Hasn't tried noom.     Wondering if she can try mounjaro or ozempic. Insurance told her that it won't be covered if she isn't diabetic. Hasn't had labs done in 5-6 yrs.   Doesn't want to do weight loss surgery.     Adhd: doing well with current doses of adderall.     Has been coughing for 3 months. Tried nebs, albuterol, mom gave her a controller inhaler and it didn't help (teresa). Tried it for 2 months.   Coughing starts a 10 am, goes away by evening. No SOB, just catches, it's mucous.   CXR in Nov was normal in [place]. Did 2 rounds of steroids.   Better stince November, but still lingering. Tried zyrtec and flonase. Didn't help.     Hair is falling out since childbirth. Hair is very thin.     Sleep problems on and off. Started taking melatonin, it was helping at first. The past month and a half, having might che. Doesn't want to take benadryl all the time.     Immunization History   Administered Date(s) Administered    DTAP INFANRIX 04/18/1995, 05/24/1999    DTP/HIB Combined 01/08/1994, 02/19/1994, 04/21/1994    HPV (Gardasil) 08/04/2008, 12/22/2008    Hep B, Unspecified Formulation 10/19/1993, 01/08/1994, 08/15/1994    Hib, Unspecified Formulation 01/30/1995    IPV 04/18/1995    MMR 01/30/1995, 05/24/1999    Meningococcal-Menactra 08/04/2008    OPV 01/08/1994, 02/19/1994, 05/24/1999    TDAP 08/04/2008, 01/30/2017,  06/24/2018    Tb Intradermal Test 10/18/1994, 08/28/1997     Wt Readings from Last 6 Encounters:   01/19/24 269 lb (122 kg)   11/27/23 260 lb (117.9 kg)   11/15/23 260 lb (117.9 kg)   07/11/23 261 lb 4 oz (118.5 kg)   11/21/22 261 lb 9.6 oz (118.7 kg)   06/07/21 253 lb (114.8 kg)     Body mass index is 42.13 kg/m².     Lab Results   Component Value Date    GLU 51 (L) 02/06/2017    GLU 89 01/15/2016     Lab Results   Component Value Date    CHOLEST 257 (H) 02/06/2017    CHOLEST 285 (H) 01/15/2016     Lab Results   Component Value Date    HDL 50 02/06/2017    HDL 36 (L) 01/15/2016     Lab Results   Component Value Date     (H) 02/06/2017     (H) 01/15/2016     Lab Results   Component Value Date    AST 17 02/06/2017    AST 17 01/15/2016     Lab Results   Component Value Date    ALT 27 02/06/2017    ALT 36 01/15/2016       Current Outpatient Medications   Medication Sig Dispense Refill    traZODone 50 MG Oral Tab Take 1 tablet (50 mg total) by mouth nightly as needed for Sleep. 30 tablet 0    amphetamine-dextroamphetamine 10 MG Oral Tab Take 1 tablet (10 mg total) by mouth daily as needed (in the afternoon). 30 tablet 0    [START ON 2/18/2024] amphetamine-dextroamphetamine 10 MG Oral Tab Take 1 tablet (10 mg total) by mouth daily as needed (in the afternoon). (Patient not taking: Reported on 1/19/2024) 30 tablet 0    [START ON 3/18/2024] amphetamine-dextroamphetamine 10 MG Oral Tab Take 1 tablet (10 mg total) by mouth daily as needed (in the afternoon). (Patient not taking: Reported on 1/19/2024) 30 tablet 0    medroxyPROGESTERone Acetate 150 MG/ML Intramuscular Suspension Prefilled Syringe Inject 150 mg into the muscle every 3 (three) months. 1 mL 0    albuterol 108 (90 Base) MCG/ACT Inhalation Aero Soln Inhale 2 puffs into the lungs every 4 (four) hours as needed for Wheezing. 1 each 0    amphetamine-dextroamphetamine ER (ADDERALL XR) 30 MG Oral Capsule SR 24 Hr Take 1 capsule (30 mg total) by mouth  daily. (Patient not taking: Reported on 1/19/2024) 30 capsule 0    [START ON 1/21/2024] amphetamine-dextroamphetamine ER (ADDERALL XR) 30 MG Oral Capsule SR 24 Hr Take 1 capsule (30 mg total) by mouth daily. (Patient not taking: Reported on 1/19/2024) 30 capsule 0      Past Medical History:   Diagnosis Date    Anxiety     Calculus of kidney     Depression     Obesity       Past Surgical History:   Procedure Laterality Date    TONSILLECTOMY  2009      Family History   Problem Relation Age of Onset    Cancer Maternal Grandmother         breast    Heart Disorder Maternal Grandmother     Obesity Maternal Grandmother       Social History:   Social History     Socioeconomic History    Marital status:    Tobacco Use    Smoking status: Never    Smokeless tobacco: Never   Substance and Sexual Activity    Alcohol use: No     Alcohol/week: 0.0 standard drinks of alcohol     Comment: occasionally    Drug use: No     Occ: working from home. : yes. Children: 1 daughter age 5.   Exercise: walking.  Diet: watches calories closely     REVIEW OF SYSTEMS:   GENERAL: feels well otherwise  SKIN: denies any unusual skin lesions  EYES:denies blurred vision or double vision  HEENT: denies nasal congestion, sinus pain or ST  LUNGS: denies shortness of breath with exertion  CARDIOVASCULAR: denies chest pain on exertion  GI: denies abdominal pain,denies heartburn  : denies dysuria, vaginal discharge or itching,periods absent while on depo   MUSCULOSKELETAL: denies back pain or joint pains   NEURO: denies headaches  PSYCHE: denies depression, some anxiety, + adhd  HEMATOLOGIC: denies hx of anemia  ENDOCRINE: denies thyroid history  ALL/ASTHMA: denies hx of allergy or asthma    EXAM:   /86 (BP Location: Left arm, Patient Position: Sitting, Cuff Size: large)   Pulse 87   Temp 98.3 °F (36.8 °C) (Temporal)   Resp 18   Wt 269 lb (122 kg)   SpO2 98%   BMI 42.13 kg/m²   Body mass index is 42.13 kg/m².   GENERAL: well  developed, well nourished,in no apparent distress  SKIN: no rashes,no suspicious lesions  HEENT: atraumatic, normocephalic,ears and throat are clear  EYES:PERRLA, EOMI, conjunctiva are clear  NECK: supple,no adenopathy,  CHEST: no chest tenderness  BREAST: no dominant or suspicious mass  LUNGS: clear to auscultation  CARDIO: RRR without murmur  GI: good BS's,no masses, HSM or tenderness  :introitus is normal,scant discharge,cervix is pink, rough surface and friable, no adnexal masses or tenderness, PAP was done   MUSCULOSKELETAL: back is not tender,FROM of the back  EXTREMITIES: no cyanosis, clubbing or edema  NEURO: Oriented times three,cranial nerves are intact,motor and sensory are grossly intact    ASSESSMENT AND PLAN:   Nakia Lang is a 28 year old female who presents for a complete physical exam.  Pap and pelvic done. Not due for mammogram and dexascan. Self breast exam explained. Health maintenance, will check fasting Lipids, CMP, and CBC. Pt not due for screening colonoscopy.  The patient indicates understanding of these issues and agrees to the plan.  The patient is asked to return for CPX in 1 yr or sooner if needed for weight loss follow AND in 6 months for med check.     Encounter Diagnoses   Name Primary?    Well woman exam with routine gynecological exam Yes    Healthy adult on routine physical examination     Screening for cervical cancer     Class 3 severe obesity with body mass index (BMI) of 40.0 to 44.9 in adult, unspecified obesity type, unspecified whether serious comorbidity present (HCC) She would like to try zepbound or mounjaro, but not sure it will be covered.   Check labs for DM.     Insomnia, unspecified type  ADHD Trial of trazodone   Doing well with adderall. Continue same dose.        Orders Placed This Encounter   Procedures    CBC With Differential With Platelet    Comp Metabolic Panel (14)    Hemoglobin A1C    Lipid Panel    TSH W Reflex To Free T4    Hpv Dna  High Risk ,  Thin Prep Collect    VENIPUNCTURE    ThinPrep PAP Smear       Meds & Refills for this Visit:  Requested Prescriptions     Signed Prescriptions Disp Refills    traZODone 50 MG Oral Tab 30 tablet 0     Sig: Take 1 tablet (50 mg total) by mouth nightly as needed for Sleep.       Imaging & Consults:  None

## 2024-01-20 ENCOUNTER — TELEPHONE (OUTPATIENT)
Dept: FAMILY MEDICINE CLINIC | Facility: CLINIC | Age: 29
End: 2024-01-20

## 2024-01-20 DIAGNOSIS — E78.5 HYPERLIPIDEMIA, UNSPECIFIED HYPERLIPIDEMIA TYPE: Primary | ICD-10-CM

## 2024-01-20 LAB
EST. AVERAGE GLUCOSE BLD GHB EST-MCNC: 114 MG/DL (ref 68–126)
HBA1C MFR BLD: 5.6 % (ref ?–5.7)

## 2024-01-20 NOTE — TELEPHONE ENCOUNTER
Patient notified and verbalized understanding.     States she was on Atorvastatin before she got pregnant and did not have any issues.  States she is fine going back on that or trying rosuvastatin. States whatever Dr Magaña thinks is best.  Would like zepbound sent to pharmacy as well. If insurance will not cover will see if she can get a coupon card     Uses [facility] [place]

## 2024-01-20 NOTE — TELEPHONE ENCOUNTER
----- Message from Betty Magaña, DO sent at 1/20/2024 10:34 AM CST -----  Pls call: Nakia, you are not diabetic, your thyroid is normal. Blood cell counts are normal. Liver and kidney tests look okay, slightly off, but nothing to worry about. Cholesterol is quite high, like you have genetic high cholesterol. I strongly recommend getting back on the statin medication. I recommend rosuvastatin 20 mg nightly. I can try sending the zepbound, we can run it through, but it's not likely to be covered based on our discussion yesterday. Let me know where to send the rosuvastatin and if you want to try running the zepbound through.   Recall lipid and cmp in 3 months.

## 2024-01-22 LAB — HPV I/H RISK 1 DNA SPEC QL NAA+PROBE: NEGATIVE

## 2024-01-22 RX ORDER — TIRZEPATIDE 2.5 MG/.5ML
2.5 INJECTION, SOLUTION SUBCUTANEOUS
Qty: 2 ML | Refills: 0 | Status: SHIPPED | OUTPATIENT
Start: 2024-01-22

## 2024-01-22 RX ORDER — ROSUVASTATIN CALCIUM 20 MG/1
20 TABLET, COATED ORAL NIGHTLY
Qty: 90 TABLET | Refills: 0 | Status: SHIPPED | OUTPATIENT
Start: 2024-01-22

## 2024-01-22 NOTE — TELEPHONE ENCOUNTER
[facility]

## 2024-01-23 ENCOUNTER — TELEPHONE (OUTPATIENT)
Dept: FAMILY MEDICINE CLINIC | Facility: CLINIC | Age: 29
End: 2024-01-23

## 2024-01-23 NOTE — TELEPHONE ENCOUNTER
Patient states not covered with insurance without prior auth    Tirzepatide-Weight Management (ZEPBOUND) 2.5 MG/0.5ML Subcutaneous Solution Auto-injector     Please adv  Thank you

## 2024-01-25 LAB
.: NORMAL
.: NORMAL

## 2024-01-30 NOTE — TELEPHONE ENCOUNTER
Patient notified via detailed voicemail left at cell number (ok per  HIPAA consent)  Advised can look for coupon card online. If any questions let us know

## 2024-02-18 DIAGNOSIS — G47.00 INSOMNIA, UNSPECIFIED TYPE: ICD-10-CM

## 2024-02-19 RX ORDER — TRAZODONE HYDROCHLORIDE 50 MG/1
50 TABLET ORAL NIGHTLY PRN
Qty: 30 TABLET | Refills: 0 | Status: SHIPPED | OUTPATIENT
Start: 2024-02-19

## 2024-02-19 RX ORDER — TRAZODONE HYDROCHLORIDE 50 MG/1
50 TABLET ORAL NIGHTLY PRN
Qty: 30 TABLET | Refills: 0 | OUTPATIENT
Start: 2024-02-19

## 2024-02-21 ENCOUNTER — TELEPHONE (OUTPATIENT)
Dept: FAMILY MEDICINE CLINIC | Facility: CLINIC | Age: 29
End: 2024-02-21

## 2024-02-21 NOTE — TELEPHONE ENCOUNTER
ePA requested for Amphetamine-Dextroamphet ER (ADDERALL XR) 15 MG Oral Capsule SR 24 Hr   Sig - Route: Take 2 capsules (30 mg total) by mouth every morning

## 2024-02-22 NOTE — TELEPHONE ENCOUNTER
Approved    Prior authorization approved   Case ID: [id number]      Payer: [facility]    [phone]    [phone]   Your PA request has been approved.  Additional information will be provided in the approval communication. (Message 1145)   Approval Details    Authorized from February 22, 2024 to February 22, 2027      Electronic appeal: Not supported   View History     Medication Being Authorized     Amphetamine-Dextroamphet ER (ADDERALL XR) 15 MG Oral Capsule SR 24 Hr          Pharmacy notified and states med in process now.

## 2024-03-15 DIAGNOSIS — G47.00 INSOMNIA, UNSPECIFIED TYPE: ICD-10-CM

## 2024-03-15 RX ORDER — TRAZODONE HYDROCHLORIDE 50 MG/1
50 TABLET ORAL NIGHTLY PRN
Qty: 30 TABLET | Refills: 0 | OUTPATIENT
Start: 2024-03-15

## 2024-03-18 DIAGNOSIS — F90.0 ATTENTION DEFICIT HYPERACTIVITY DISORDER (ADHD), PREDOMINANTLY INATTENTIVE TYPE: ICD-10-CM

## 2024-03-18 DIAGNOSIS — G47.00 INSOMNIA, UNSPECIFIED TYPE: ICD-10-CM

## 2024-03-18 RX ORDER — DEXTROAMPHETAMINE SACCHARATE, AMPHETAMINE ASPARTATE, DEXTROAMPHETAMINE SULFATE AND AMPHETAMINE SULFATE 2.5; 2.5; 2.5; 2.5 MG/1; MG/1; MG/1; MG/1
10 TABLET ORAL DAILY PRN
Qty: 30 TABLET | Refills: 0 | OUTPATIENT
Start: 2024-03-18

## 2024-03-18 RX ORDER — TRAZODONE HYDROCHLORIDE 50 MG/1
50 TABLET ORAL NIGHTLY PRN
Qty: 30 TABLET | Refills: 0 | Status: SHIPPED | OUTPATIENT
Start: 2024-03-18

## 2024-03-18 NOTE — TELEPHONE ENCOUNTER
Dr Magaña refilled adderall 10mg on 3/18/24-this request refused      Trazodone last refilled 2/19/24 #30 0 refill  Last OV 1/19/24  No future appointments.

## 2024-03-20 DIAGNOSIS — F90.0 ATTENTION DEFICIT HYPERACTIVITY DISORDER (ADHD), PREDOMINANTLY INATTENTIVE TYPE: ICD-10-CM

## 2024-03-20 RX ORDER — DEXTROAMPHETAMINE SACCHARATE, AMPHETAMINE ASPARTATE MONOHYDRATE, DEXTROAMPHETAMINE SULFATE AND AMPHETAMINE SULFATE 3.75; 3.75; 3.75; 3.75 MG/1; MG/1; MG/1; MG/1
30 CAPSULE, EXTENDED RELEASE ORAL EVERY MORNING
Qty: 60 CAPSULE | Refills: 0 | OUTPATIENT
Start: 2024-03-20 | End: 2024-04-19

## 2024-03-21 DIAGNOSIS — F90.0 ATTENTION DEFICIT HYPERACTIVITY DISORDER (ADHD), PREDOMINANTLY INATTENTIVE TYPE: ICD-10-CM

## 2024-03-21 RX ORDER — DEXTROAMPHETAMINE SACCHARATE, AMPHETAMINE ASPARTATE MONOHYDRATE, DEXTROAMPHETAMINE SULFATE AND AMPHETAMINE SULFATE 3.75; 3.75; 3.75; 3.75 MG/1; MG/1; MG/1; MG/1
30 CAPSULE, EXTENDED RELEASE ORAL EVERY MORNING
Qty: 60 CAPSULE | Refills: 0 | Status: SHIPPED | OUTPATIENT
Start: 2024-03-21 | End: 2024-03-21

## 2024-03-21 RX ORDER — DEXTROAMPHETAMINE SACCHARATE, AMPHETAMINE ASPARTATE MONOHYDRATE, DEXTROAMPHETAMINE SULFATE AND AMPHETAMINE SULFATE 3.75; 3.75; 3.75; 3.75 MG/1; MG/1; MG/1; MG/1
30 CAPSULE, EXTENDED RELEASE ORAL EVERY MORNING
Qty: 60 CAPSULE | Refills: 0 | Status: SHIPPED | OUTPATIENT
Start: 2024-03-21 | End: 2024-04-20

## 2024-03-21 NOTE — TELEPHONE ENCOUNTER
Script sent to [place] in error.     Resent to [place], please cancel at [facility].

## 2024-03-21 NOTE — TELEPHONE ENCOUNTER
[facility] #[id number] - [place], [place] - [address] AT [facility] ([address]), [phone], [phone]   [address] [address]   Phone: [phone] Fax: [phone]   Hours: Not open 24 hours       PATIENT REQUESTING REFILL FOR HER ADDERALL 15 MG.

## 2024-03-29 DIAGNOSIS — Z30.09 BIRTH CONTROL COUNSELING: ICD-10-CM

## 2024-03-29 RX ORDER — MEDROXYPROGESTERONE ACETATE 150 MG/ML
1 INJECTION, SUSPENSION INTRAMUSCULAR
Qty: 1 ML | Refills: 0 | Status: SHIPPED | OUTPATIENT
Start: 2024-03-29

## 2024-03-29 NOTE — TELEPHONE ENCOUNTER
Pt failed refill protocol for the following reasons:  medroxyPROGESTERone Acetate 150 MG/ML Intramuscular Suspension Prefilled Syringe          Sig: Inject 150 mg into the muscle every 3 (three) months.    Disp: 1 mL    Refills: 0    Start: 3/29/2024    Class: Normal    Non-formulary For: Birth control counseling    Last ordered: 2 months ago (1/4/2024) by Betty Magaña, DO       To be filled at: [id number] [address] AT [facility] ([address]), [phone], [phone]         Last refill: 1/4/24  Last appt: 1/19/24  Next appt: No future appointments.      Forward to Dr. Magaña, please advise on refills. Thank you.

## 2024-04-04 ENCOUNTER — OFFICE VISIT (OUTPATIENT)
Dept: FAMILY MEDICINE CLINIC | Facility: CLINIC | Age: 29
End: 2024-04-04
Payer: COMMERCIAL

## 2024-04-04 VITALS
HEART RATE: 96 BPM | SYSTOLIC BLOOD PRESSURE: 122 MMHG | BODY MASS INDEX: 39.24 KG/M2 | DIASTOLIC BLOOD PRESSURE: 74 MMHG | OXYGEN SATURATION: 98 % | RESPIRATION RATE: 16 BRPM | WEIGHT: 250 LBS | HEIGHT: 67 IN | TEMPERATURE: 98 F

## 2024-04-04 DIAGNOSIS — R68.89 FLU-LIKE SYMPTOMS: Primary | ICD-10-CM

## 2024-04-04 DIAGNOSIS — R53.83 OTHER FATIGUE: ICD-10-CM

## 2024-04-04 DIAGNOSIS — R11.0 NAUSEA: ICD-10-CM

## 2024-04-04 PROCEDURE — 87637 SARSCOV2&INF A&B&RSV AMP PRB: CPT | Performed by: FAMILY MEDICINE

## 2024-04-04 PROCEDURE — 99214 OFFICE O/P EST MOD 30 MIN: CPT | Performed by: FAMILY MEDICINE

## 2024-04-04 RX ORDER — DICYCLOMINE HYDROCHLORIDE 10 MG/1
10 CAPSULE ORAL 3 TIMES DAILY PRN
Qty: 30 CAPSULE | Refills: 0 | Status: SHIPPED | OUTPATIENT
Start: 2024-04-04

## 2024-04-04 RX ORDER — ONDANSETRON 4 MG/1
4 TABLET, ORALLY DISINTEGRATING ORAL EVERY 8 HOURS PRN
Qty: 30 TABLET | Refills: 0 | Status: SHIPPED | OUTPATIENT
Start: 2024-04-04

## 2024-04-04 NOTE — PATIENT INSTRUCTIONS
Influenza (Adult)    Updated for the 2658-8994 flu season   Influenza is also called the flu. It's a viral illness that affects the air passages of your nose, sinuses, throat, and lungs. It's different from the common cold. The flu can easily be passed from one to person to another. It may be spread through the air by coughing and sneezing. It can also be spread by touching the sick person and then touching your own eyes, nose, or mouth.   The flu starts 1 to 3 days after you are exposed to the flu virus. It may last for 1 to 2 weeks but sometimes people feel tired or fatigued for many weeks afterward. You usually don’t need to take antibiotics unless you are at high risk for or have a complication from a bacterial infection. This might be an ear or sinus infection or pneumonia.   Flu symptoms may be mild or severe. They can include extreme tiredness (wanting to stay in bed all day), chills, fevers, muscle aches, soreness with eye movement, headache, and a dry, hacking cough.   Antiviral medicine for the flu is available by prescription. If you start taking it within 48 hours, it may help reduce how long your symptoms last and how severe they are. Your provider may do a test to find out if you have influenza and which strain you have.   Home care  Follow these guidelines when caring for yourself at home:  Stay away from cigarette smoke, whether it's yours or other people’s.  Acetaminophen or ibuprofen will help ease your fever, muscle aches, and headache. Don’t give aspirin to anyone younger than 18 who has the flu. This can cause a serious condition called Reye syndrome.  Nausea, loose stools, and loss of appetite are common with the flu. Eat light meals. Drink 6 to 8 glasses of liquids every day. Good choices are water, sport drinks, soft drinks without caffeine, juices, tea, and soup. Extra fluids will also help loosen secretions in your nose and lungs.  Over-the-counter cold medicines will not make the flu go  away faster. But the medicines may help with coughing, sore throat, and congestion in your nose and sinuses. Don’t use a decongestant if you have high blood pressure.  Stay home until your fever has been gone for at least 24 hours without using medicine to reduce fever.  Follow-up care  Follow up with your healthcare provider, or as advised, if you're not getting better over the next week.   If you're age 65 or older, talk with your provider about getting a pneumococcal vaccine. You should also get vaccinated against pneumococcal pneumoniae at other ages if you have a weak immune system, chronic asthma, COPD (chronic obstructive pulmonary disorder), or certain other conditions. With very few exceptions, all adults should get a flu vaccine every fall. September and October are generally good times to get vaccinated. Ask your provider about this.   When to get medical advice  Call your healthcare provider right away if you have the flu and any of these occur:   Cough with lots of colored mucus (sputum) or blood in your mucus  Chest pain, shortness of breath, wheezing, or trouble breathing  Severe headache, or face, neck, or ear pain  New rash with fever  Fever of 100.4°F (38°C) or higher, or as advised by your provider  Confusion, behavior change, or seizure  Severe weakness or dizziness  You get a new fever or cough after getting better for a few days  Also call your provider if you have flu symptoms and have a weakened immune system or are taking medicines that can weaken your immune system. These include steroids and certain anti-inflammatory medicines.   Kimberly last reviewed this educational content on 6/1/2022 © 2000-2023 [employer]. All rights reserved. This information is not intended as a substitute for professional medical care. Always follow your healthcare professional's instructions.

## 2024-04-04 NOTE — PROGRESS NOTES
Nakia Lang is a 28 year old female.    S:  Patient presents today with the following concerns:  Chief Complaint   Patient presents with    Flu     Started Sunday, congested in head and ears, nusea, has werid rash on one hand, 100.5 with tylenol  Negative at home  OTC tylenol cold and head congestion   No vomiting or diarrhea.  Not eating much.  Is taking weight loss medication that reduces appetite.  Trying to drink.  Denies dysuria or blood in the urine.  Does not get menses-on Depo provera.    Patient feels tired.  Back is achy.  Headache.    Temp up to 100.5.    Did not get flu vaccine this season.  No dyspnea.      Current Outpatient Medications   Medication Sig Dispense Refill    ondansetron 4 MG Oral Tablet Dispersible Take 1 tablet (4 mg total) by mouth every 8 (eight) hours as needed. 30 tablet 0    dicyclomine 10 MG Oral Cap Take 1 capsule (10 mg total) by mouth 3 (three) times daily as needed. 30 capsule 0    amphetamine-dextroamphetamine 10 MG Oral Tab Take 1 tablet (10 mg total) by mouth daily as needed (in the afternoon). (Patient not taking: Reported on 1/19/2024) 30 tablet 0    amphetamine-dextroamphetamine 10 MG Oral Tab Take 1 tablet (10 mg total) by mouth daily as needed (in the afternoon). (Patient not taking: Reported on 1/19/2024) 30 tablet 0    medroxyPROGESTERone Acetate 150 MG/ML Intramuscular Suspension Prefilled Syringe Inject 150 mg into the muscle every 3 (three) months. 1 mL 0    Amphetamine-Dextroamphet ER (ADDERALL XR) 15 MG Oral Capsule SR 24 Hr Take 2 capsules (30 mg total) by mouth every morning. 60 capsule 0    traZODone 50 MG Oral Tab Take 1 tablet (50 mg total) by mouth nightly as needed for Sleep. 30 tablet 0    Tirzepatide-Weight Management (ZEPBOUND) 2.5 MG/0.5ML Subcutaneous Solution Auto-injector Inject 2.5 mg into the skin every 7 days. 2 mL 0    rosuvastatin 20 MG Oral Tab Take 1 tablet (20 mg total) by mouth nightly. 90 tablet 0    amphetamine-dextroamphetamine 10  MG Oral Tab Take 1 tablet (10 mg total) by mouth daily as needed (in the afternoon). 30 tablet 0    albuterol 108 (90 Base) MCG/ACT Inhalation Aero Soln Inhale 2 puffs into the lungs every 4 (four) hours as needed for Wheezing. 1 each 0     Patient Active Problem List   Diagnosis    Class 3 severe obesity with body mass index (BMI) of 40.0 to 44.9 in adult ([facility])    Anxiety    Tension headache    Prediabetes    BMI 50.0-59.9, adult ([facility])    Attention deficit hyperactivity disorder (ADHD), predominantly inattentive type    Insomnia     Family History   Problem Relation Age of Onset    Cancer Maternal Grandmother         breast    Heart Disorder Maternal Grandmother     Obesity Maternal Grandmother        REVIEW OF SYSTEMS:  GENERAL: feels unwell.  SKIN: denies any unusual skin lesions  EYES:denies vision change  LUNGS: denies shortness of breath with exertion  CARDIOVASCULAR: denies chest pain.  GI: denies abdominal pain.  See above.  : denies dysuria  MUSCULOSKELETAL: back pain  NEURO: headaches    EXAM:  /74   Pulse 96   Temp 98.4 °F (36.9 °C)   Resp 16   Ht 5' 7\" (1.702 m)   Wt 250 lb (113.4 kg)   SpO2 98%   BMI 39.16 kg/m²   Physical Exam  Constitutional:       General: She is not in acute distress.     Appearance: Normal appearance. She is not ill-appearing or toxic-appearing.   HENT:      Head: Normocephalic and atraumatic.      Right Ear: Tympanic membrane, ear canal and external ear normal.      Left Ear: Tympanic membrane, ear canal and external ear normal.      Nose: Congestion present.      Mouth/Throat:      Mouth: Mucous membranes are moist.      Pharynx: Oropharynx is clear.   Eyes:      Extraocular Movements: Extraocular movements intact.      Conjunctiva/sclera: Conjunctivae normal.      Pupils: Pupils are equal, round, and reactive to light.   Cardiovascular:      Rate and Rhythm: Normal rate and regular rhythm.      Heart sounds: Normal heart sounds.   Pulmonary:      Effort: Pulmonary  effort is normal.      Breath sounds: Normal breath sounds.   Abdominal:      General: There is no distension.      Palpations: Abdomen is soft. There is no mass.      Tenderness: There is no abdominal tenderness. There is no right CVA tenderness, left CVA tenderness, guarding or rebound.   Musculoskeletal:      Cervical back: Neck supple.   Skin:     General: Skin is warm and dry.   Neurological:      General: No focal deficit present.      Mental Status: She is alert and oriented to person, place, and time.   Psychiatric:         Mood and Affect: Mood normal.         Behavior: Behavior normal.        ASSESSMENT AND PLAN:  Nakia Lang is a 28 year old female.  Encounter Diagnoses   Name Primary?    Flu-like symptoms Yes    Nausea     Other fatigue        No results found.     Orders Placed This Encounter   Procedures    SARS-CoV-2/Flu A and B/RSV by PCR ([facility]) [E]     Meds & Refills for this Visit:  Requested Prescriptions     Signed Prescriptions Disp Refills    ondansetron 4 MG Oral Tablet Dispersible 30 tablet 0     Sig: Take 1 tablet (4 mg total) by mouth every 8 (eight) hours as needed.    dicyclomine 10 MG Oral Cap 30 capsule 0     Sig: Take 1 capsule (10 mg total) by mouth 3 (three) times daily as needed.     Imaging & Consults:  None    [facility] sent out.  We have a lot of influenza going around.  She would like refill on Zofran.  Will also send dicyclomine to see if this helps.  Important for her to eat or drink to get some calories. Dietary information given.  She has lost almost 20 lbs on weight loss medications.  Her next semaglutide injection is next Monday.    If respiratory panel is negative and symptoms of weakness persist tomorrow should be seen at higher level of care for further evaluation.    Go to ED with dyspnea, abdominal pain, profound weakness or headache.    Patient verbalizes understanding of plan.    No follow-ups on file.

## 2024-04-05 ENCOUNTER — TELEPHONE (OUTPATIENT)
Dept: FAMILY MEDICINE CLINIC | Facility: CLINIC | Age: 29
End: 2024-04-05

## 2024-04-05 ENCOUNTER — PATIENT MESSAGE (OUTPATIENT)
Dept: FAMILY MEDICINE CLINIC | Facility: CLINIC | Age: 29
End: 2024-04-05

## 2024-04-05 LAB
FLUAV + FLUBV RNA SPEC NAA+PROBE: DETECTED
FLUAV + FLUBV RNA SPEC NAA+PROBE: NOT DETECTED
RSV RNA SPEC NAA+PROBE: NOT DETECTED
SARS-COV-2 RNA RESP QL NAA+PROBE: NOT DETECTED

## 2024-04-05 RX ORDER — ONDANSETRON 4 MG/1
4 TABLET, FILM COATED ORAL EVERY 8 HOURS PRN
Qty: 30 TABLET | Refills: 0 | Status: SHIPPED | OUTPATIENT
Start: 2024-04-05

## 2024-04-05 NOTE — TELEPHONE ENCOUNTER
Spoke with patient who states insurance will not cover zofran sent by [facility]  Asking if [facility] can resend. [facility] sent disintegrating tablet. Patient OK with regular tablet

## 2024-04-05 NOTE — TELEPHONE ENCOUNTER
Pt went to urgent care yesterday, she has the flu b, [facility] is telling her zofran cannot be filled, pt thinks it might need a prior? Cannot get a hold of [facility] (keeps getting hung up on), will Dr Magaña help her?

## 2024-04-05 NOTE — TELEPHONE ENCOUNTER
From: Nakia Lang  To: Betty Magaña  Sent: 4/5/2024 11:25 AM CDT  Subject: Zofran    Hi, I went to urgent care yesterday after being super sick all week. Pretty sure its Influenza but still waiting for results. The doctor did send over Zofran for me because I'm still super nauseus and weak because I can't eat. [facility] is showing an insurance issue for the zofran I tried calling over there and got put on hold twice and they just kept disconnecting the call. I'm so miserable and I am just needing the zofran filled asap but angelina if they are trying to send a P/A to the urgent care doctor or what is going on. Are you able to just send a script over for Zofran so that way if they need a P/A it can actually get done? I've never had an issue filling zofran before but have zero energy to keep calling [facility] and them disconnecting the call :(

## 2024-04-08 ENCOUNTER — PATIENT MESSAGE (OUTPATIENT)
Dept: FAMILY MEDICINE CLINIC | Facility: CLINIC | Age: 29
End: 2024-04-08

## 2024-04-08 DIAGNOSIS — Z30.42 ENCOUNTER FOR SURVEILLANCE OF INJECTABLE CONTRACEPTIVE: Primary | ICD-10-CM

## 2024-04-08 RX ORDER — MEDROXYPROGESTERONE ACETATE 150 MG/ML
150 INJECTION, SUSPENSION INTRAMUSCULAR ONCE
Status: SHIPPED | OUTPATIENT
Start: 2024-04-06

## 2024-04-08 NOTE — TELEPHONE ENCOUNTER
From: Nakia Lang  To: Betty Magaña  Sent: 4/8/2024 8:53 AM CDT  Subject: Depo    Good morning, my mom did my depo shot on Saturday the 6th. Can you please let me know the date range for the next one? Thank you!

## 2024-04-16 DIAGNOSIS — G47.00 INSOMNIA, UNSPECIFIED TYPE: ICD-10-CM

## 2024-04-16 RX ORDER — TRAZODONE HYDROCHLORIDE 50 MG/1
50 TABLET ORAL NIGHTLY PRN
Qty: 30 TABLET | Refills: 0 | Status: SHIPPED | OUTPATIENT
Start: 2024-04-16

## 2024-04-16 NOTE — TELEPHONE ENCOUNTER
LOV 1/19/24     Last Refill   Medication Quantity Refills Start End   traZODone 50 MG Oral Tab 30 tablet 0 3/18/2024        No future appointments.

## 2024-04-18 ENCOUNTER — PATIENT MESSAGE (OUTPATIENT)
Dept: FAMILY MEDICINE CLINIC | Facility: CLINIC | Age: 29
End: 2024-04-18

## 2024-04-18 DIAGNOSIS — F90.0 ATTENTION DEFICIT HYPERACTIVITY DISORDER (ADHD), PREDOMINANTLY INATTENTIVE TYPE: ICD-10-CM

## 2024-04-19 RX ORDER — DEXTROAMPHETAMINE SACCHARATE, AMPHETAMINE ASPARTATE MONOHYDRATE, DEXTROAMPHETAMINE SULFATE AND AMPHETAMINE SULFATE 3.75; 3.75; 3.75; 3.75 MG/1; MG/1; MG/1; MG/1
30 CAPSULE, EXTENDED RELEASE ORAL EVERY MORNING
Qty: 60 CAPSULE | Refills: 0 | Status: SHIPPED | OUTPATIENT
Start: 2024-04-19 | End: 2024-04-20

## 2024-04-19 RX ORDER — DEXTROAMPHETAMINE SACCHARATE, AMPHETAMINE ASPARTATE, DEXTROAMPHETAMINE SULFATE AND AMPHETAMINE SULFATE 2.5; 2.5; 2.5; 2.5 MG/1; MG/1; MG/1; MG/1
10 TABLET ORAL DAILY PRN
Qty: 30 TABLET | Refills: 0 | Status: SHIPPED | OUTPATIENT
Start: 2024-04-19

## 2024-04-19 NOTE — TELEPHONE ENCOUNTER
Pt failed refill protocol for the following reasons:   amphetamine-dextroamphetamine 10 MG Oral Tab          Possible duplicate: Hover to review recent actions on this medication    Sig: Take 1 tablet (10 mg total) by mouth daily as needed (in the afternoon).    Disp: 30 tablet    Refills: 0    Start: 4/18/2024    Earliest Fill Date: 4/18/2024    Class: Normal    For: Attention deficit hyperactivity disorder (ADHD), predominantly inattentive type    Last ordered: 3 months ago (1/18/2024) by Betty Magaña, DO    Controlled Substance Medication Htvwux2304/18/2024 06:55 PM    This medication is a controlled substance - forward to provider to refill      To be filled at: [id number] [address] AT [facility] ([address]), [phone], [phone]            Last refill: 3/21/24  Last appt: 1/19/24  Next appt: No future appointments.      Forward to Dr. Magaña, please advise on refills. Thank you.

## 2024-04-19 NOTE — TELEPHONE ENCOUNTER
From: Nakia Lang  To: Betty Magaña  Sent: 4/18/2024 6:58 PM CDT  Subject: 30mg adderal    Hi, can you please send over a prescription for the 30mg Adderall XR? It isn't showing up in my med list anymore for me to just request a refill that way. Thank you!

## 2024-04-20 RX ORDER — DEXTROAMPHETAMINE SACCHARATE, AMPHETAMINE ASPARTATE MONOHYDRATE, DEXTROAMPHETAMINE SULFATE AND AMPHETAMINE SULFATE 3.75; 3.75; 3.75; 3.75 MG/1; MG/1; MG/1; MG/1
30 CAPSULE, EXTENDED RELEASE ORAL EVERY MORNING
Qty: 30 CAPSULE | Refills: 0 | Status: SHIPPED | OUTPATIENT
Start: 2024-04-20 | End: 2024-05-20

## 2024-04-21 DIAGNOSIS — G47.00 INSOMNIA, UNSPECIFIED TYPE: ICD-10-CM

## 2024-04-22 RX ORDER — TRAZODONE HYDROCHLORIDE 50 MG/1
50 TABLET ORAL NIGHTLY PRN
Qty: 30 TABLET | Refills: 0 | OUTPATIENT
Start: 2024-04-22

## 2024-05-13 DIAGNOSIS — G47.00 INSOMNIA, UNSPECIFIED TYPE: ICD-10-CM

## 2024-05-13 RX ORDER — TRAZODONE HYDROCHLORIDE 50 MG/1
50 TABLET ORAL NIGHTLY PRN
Qty: 30 TABLET | Refills: 0 | Status: SHIPPED | OUTPATIENT
Start: 2024-05-13

## 2024-05-13 NOTE — TELEPHONE ENCOUNTER
Routing to provider per protocol.   TRAZODONE 50 MG Oral Tab   Last refilled on 4/16/24 for #30  with 0 rf.   Last labs 1/19/24.   Last seen on 1/19/24.     No future appointments.       Thank you.

## 2024-05-18 DIAGNOSIS — F90.0 ATTENTION DEFICIT HYPERACTIVITY DISORDER (ADHD), PREDOMINANTLY INATTENTIVE TYPE: ICD-10-CM

## 2024-05-18 RX ORDER — DEXTROAMPHETAMINE SACCHARATE, AMPHETAMINE ASPARTATE, DEXTROAMPHETAMINE SULFATE AND AMPHETAMINE SULFATE 2.5; 2.5; 2.5; 2.5 MG/1; MG/1; MG/1; MG/1
10 TABLET ORAL DAILY PRN
Qty: 30 TABLET | Refills: 0 | Status: SHIPPED | OUTPATIENT
Start: 2024-05-18

## 2024-06-10 DIAGNOSIS — F90.0 ATTENTION DEFICIT HYPERACTIVITY DISORDER (ADHD), PREDOMINANTLY INATTENTIVE TYPE: ICD-10-CM

## 2024-06-10 RX ORDER — DEXTROAMPHETAMINE SACCHARATE, AMPHETAMINE ASPARTATE MONOHYDRATE, DEXTROAMPHETAMINE SULFATE AND AMPHETAMINE SULFATE 7.5; 7.5; 7.5; 7.5 MG/1; MG/1; MG/1; MG/1
30 CAPSULE, EXTENDED RELEASE ORAL EVERY MORNING
Qty: 30 CAPSULE | Refills: 0 | Status: SHIPPED | OUTPATIENT
Start: 2024-06-10

## 2024-06-10 NOTE — TELEPHONE ENCOUNTER
Controlled Substance Medication Xwftdb15/10/2024 11:00 AM    This medication is a controlled substance - forward to provider to refill      Routing to provider per protocol.   amphetamine-dextroamphetamine ER 30 MG Oral Capsule SR 24 Hr   Last refilled on 5/13/24 for #30  with 0 rf.   Last labs 1/19/24.   Last seen on 1/19/24.     No future appointments.       Thank you.

## 2024-06-11 DIAGNOSIS — G47.00 INSOMNIA, UNSPECIFIED TYPE: ICD-10-CM

## 2024-06-11 RX ORDER — TRAZODONE HYDROCHLORIDE 50 MG/1
50 TABLET ORAL NIGHTLY PRN
Qty: 30 TABLET | Refills: 0 | Status: SHIPPED | OUTPATIENT
Start: 2024-06-11

## 2024-06-16 DIAGNOSIS — F90.0 ATTENTION DEFICIT HYPERACTIVITY DISORDER (ADHD), PREDOMINANTLY INATTENTIVE TYPE: ICD-10-CM

## 2024-06-17 ENCOUNTER — TELEPHONE (OUTPATIENT)
Dept: FAMILY MEDICINE CLINIC | Facility: CLINIC | Age: 29
End: 2024-06-17

## 2024-06-17 RX ORDER — DEXTROAMPHETAMINE SACCHARATE, AMPHETAMINE ASPARTATE, DEXTROAMPHETAMINE SULFATE AND AMPHETAMINE SULFATE 2.5; 2.5; 2.5; 2.5 MG/1; MG/1; MG/1; MG/1
10 TABLET ORAL DAILY PRN
Qty: 30 TABLET | Refills: 0 | OUTPATIENT
Start: 2024-06-17

## 2024-06-17 NOTE — TELEPHONE ENCOUNTER
Controlled Substance Medication Jqvlsj0006/16/2024 11:09 AM    This medication is a controlled substance - forward to provider to refill

## 2024-06-17 NOTE — TELEPHONE ENCOUNTER
Lab Frequency Next Occurrence   Lipid Panel [E] Once 04/20/2024   Comp Metabolic Panel (14) [E] Once 04/20/2024     Letter mailed to patient reminding them they have outstanding orders.

## 2024-06-29 DIAGNOSIS — Z30.09 BIRTH CONTROL COUNSELING: ICD-10-CM

## 2024-06-29 RX ORDER — MEDROXYPROGESTERONE ACETATE 150 MG/ML
1 INJECTION, SUSPENSION INTRAMUSCULAR
Qty: 1 ML | Refills: 0 | Status: SHIPPED | OUTPATIENT
Start: 2024-06-29

## 2024-06-29 NOTE — TELEPHONE ENCOUNTER
Routing to provider per protocol.   medroxyPROGESTERone Acetate  mg   Last refilled on 4/6/24 for #150  with 0 rf.   Last labs 1/19/24.   Last seen on 1/19/24.     No future appointments.       Thank you.

## 2024-07-02 ENCOUNTER — PATIENT MESSAGE (OUTPATIENT)
Dept: FAMILY MEDICINE CLINIC | Facility: CLINIC | Age: 29
End: 2024-07-02

## 2024-07-02 DIAGNOSIS — Z30.42 ENCOUNTER FOR SURVEILLANCE OF INJECTABLE CONTRACEPTIVE: Primary | ICD-10-CM

## 2024-07-02 DIAGNOSIS — Z30.09 BIRTH CONTROL COUNSELING: ICD-10-CM

## 2024-07-02 RX ORDER — MEDROXYPROGESTERONE ACETATE 150 MG/ML
1 INJECTION, SUSPENSION INTRAMUSCULAR
Qty: 1 ML | Refills: 0 | Status: SHIPPED | OUTPATIENT
Start: 2024-07-02

## 2024-07-02 RX ORDER — MEDROXYPROGESTERONE ACETATE 150 MG/ML
150 INJECTION, SUSPENSION INTRAMUSCULAR ONCE
Status: SHIPPED | OUTPATIENT
Start: 2024-07-02

## 2024-07-02 NOTE — TELEPHONE ENCOUNTER
Routed to Dr Magaña to advise on depo refill for next dose due 9/24-10/1    Last OV 1/19/24  Last pap 1/19/24  Last refilled 6/29/24  1 ml  0 refill

## 2024-07-02 NOTE — TELEPHONE ENCOUNTER
From: Nakia Lang  To: Betty Magaña  Sent: 7/2/2024 12:21 PM CDT  Subject: Depo    Hi there. My mom just did my shot today. July 2nd. Can you please let me know the dates for the next one? Also I think I need a Refill of the depo sent to [facility]. Thank you

## 2024-07-08 DIAGNOSIS — F90.0 ATTENTION DEFICIT HYPERACTIVITY DISORDER (ADHD), PREDOMINANTLY INATTENTIVE TYPE: ICD-10-CM

## 2024-07-08 RX ORDER — DEXTROAMPHETAMINE SACCHARATE, AMPHETAMINE ASPARTATE MONOHYDRATE, DEXTROAMPHETAMINE SULFATE AND AMPHETAMINE SULFATE 7.5; 7.5; 7.5; 7.5 MG/1; MG/1; MG/1; MG/1
30 CAPSULE, EXTENDED RELEASE ORAL EVERY MORNING
Qty: 30 CAPSULE | Refills: 0 | Status: SHIPPED | OUTPATIENT
Start: 2024-07-08

## 2024-07-09 DIAGNOSIS — G47.00 INSOMNIA, UNSPECIFIED TYPE: ICD-10-CM

## 2024-07-09 RX ORDER — TRAZODONE HYDROCHLORIDE 50 MG/1
50 TABLET ORAL NIGHTLY PRN
Qty: 30 TABLET | Refills: 0 | Status: SHIPPED | OUTPATIENT
Start: 2024-07-09

## 2024-07-09 NOTE — TELEPHONE ENCOUNTER
LOV 1/19/24     Last Refill   TRAZODONE 50 MG Oral Tab 30 tablet 0 6/11/2024       No future appointments.

## 2024-07-17 DIAGNOSIS — F90.0 ATTENTION DEFICIT HYPERACTIVITY DISORDER (ADHD), PREDOMINANTLY INATTENTIVE TYPE: ICD-10-CM

## 2024-07-17 DIAGNOSIS — E78.5 HYPERLIPIDEMIA, UNSPECIFIED HYPERLIPIDEMIA TYPE: ICD-10-CM

## 2024-07-17 NOTE — TELEPHONE ENCOUNTER
Cholesterol Medication Protocol Kothdc7507/17/2024 11:54 AM   Protocol Details ALT < 80    ALT resulted within past year    Lipid panel within past 12 months    In person appointment or virtual visit in the past 12 mos or appointment in next 3 mos   Routing to provider per protocol.   rosuvastatin 20 MG Oral Tab   Last refilled on 1/22/24 for #90  with 0 rf.   Last labs 1/19/24.   Last seen on 1/19/24.     No future appointments.       Thank you.

## 2024-07-17 NOTE — TELEPHONE ENCOUNTER
Controlled Substance Medication Qkpwny4907/17/2024 11:53 AM    This medication is a controlled substance - forward to provider to refill      Routing to provider per protocol.   amphetamine-dextroamphetamine 10 MG Oral Tab   Last refilled on 6/18/24 for #30  with 0 rf.   Last labs 1/19/24.   Last seen on 1/19/24.     No future appointments.       Thank you.

## 2024-07-18 RX ORDER — DEXTROAMPHETAMINE SACCHARATE, AMPHETAMINE ASPARTATE, DEXTROAMPHETAMINE SULFATE AND AMPHETAMINE SULFATE 2.5; 2.5; 2.5; 2.5 MG/1; MG/1; MG/1; MG/1
10 TABLET ORAL DAILY PRN
Qty: 30 TABLET | Refills: 0 | Status: SHIPPED | OUTPATIENT
Start: 2024-07-18

## 2024-07-18 RX ORDER — ROSUVASTATIN CALCIUM 20 MG/1
20 TABLET, COATED ORAL NIGHTLY
Qty: 90 TABLET | Refills: 0 | Status: SHIPPED | OUTPATIENT
Start: 2024-07-18

## 2024-08-07 DIAGNOSIS — F90.0 ATTENTION DEFICIT HYPERACTIVITY DISORDER (ADHD), PREDOMINANTLY INATTENTIVE TYPE: ICD-10-CM

## 2024-08-07 NOTE — TELEPHONE ENCOUNTER
Patient requests refill    amphetamine-dextroamphetamine ER 30 MG Oral Capsule SR 24 Hr     Destinee denney

## 2024-08-07 NOTE — TELEPHONE ENCOUNTER
Last OV:01/19/2024  Last refill:07/08/2024, 30 caps, 0 refill     Medication pended, please sign if appropriate

## 2024-08-08 RX ORDER — DEXTROAMPHETAMINE SACCHARATE, AMPHETAMINE ASPARTATE MONOHYDRATE, DEXTROAMPHETAMINE SULFATE AND AMPHETAMINE SULFATE 7.5; 7.5; 7.5; 7.5 MG/1; MG/1; MG/1; MG/1
30 CAPSULE, EXTENDED RELEASE ORAL EVERY MORNING
Qty: 30 CAPSULE | Refills: 0 | Status: SHIPPED | OUTPATIENT
Start: 2024-08-08

## 2024-08-14 ENCOUNTER — PATIENT MESSAGE (OUTPATIENT)
Dept: FAMILY MEDICINE CLINIC | Facility: CLINIC | Age: 29
End: 2024-08-14

## 2024-08-14 DIAGNOSIS — F90.0 ATTENTION DEFICIT HYPERACTIVITY DISORDER (ADHD), PREDOMINANTLY INATTENTIVE TYPE: ICD-10-CM

## 2024-08-14 NOTE — TELEPHONE ENCOUNTER
From: Nakia Lang  To: Betty Magaña  Sent: 8/14/2024 11:14 AM CDT  Subject: Refill    Hi! We are leaving to go out of town on Friday, is it possible to refill my 10mg adderall tomorrow so I can pick it up before we leave?

## 2024-08-15 RX ORDER — DEXTROAMPHETAMINE SACCHARATE, AMPHETAMINE ASPARTATE, DEXTROAMPHETAMINE SULFATE AND AMPHETAMINE SULFATE 2.5; 2.5; 2.5; 2.5 MG/1; MG/1; MG/1; MG/1
10 TABLET ORAL DAILY PRN
Qty: 30 TABLET | Refills: 0 | Status: SHIPPED | OUTPATIENT
Start: 2024-08-15 | End: 2024-08-16

## 2024-08-16 RX ORDER — DEXTROAMPHETAMINE SACCHARATE, AMPHETAMINE ASPARTATE, DEXTROAMPHETAMINE SULFATE AND AMPHETAMINE SULFATE 2.5; 2.5; 2.5; 2.5 MG/1; MG/1; MG/1; MG/1
10 TABLET ORAL DAILY PRN
Qty: 30 TABLET | Refills: 0 | Status: SHIPPED | OUTPATIENT
Start: 2024-08-16

## 2024-08-16 NOTE — TELEPHONE ENCOUNTER
Called to cancel script at [facility] and was advised patient already picked up script    Called and spoke with Ivon at [facility] in [place] and cancelled script

## 2024-09-06 DIAGNOSIS — F90.0 ATTENTION DEFICIT HYPERACTIVITY DISORDER (ADHD), PREDOMINANTLY INATTENTIVE TYPE: ICD-10-CM

## 2024-09-06 RX ORDER — DEXTROAMPHETAMINE SACCHARATE, AMPHETAMINE ASPARTATE MONOHYDRATE, DEXTROAMPHETAMINE SULFATE AND AMPHETAMINE SULFATE 7.5; 7.5; 7.5; 7.5 MG/1; MG/1; MG/1; MG/1
30 CAPSULE, EXTENDED RELEASE ORAL EVERY MORNING
Qty: 30 CAPSULE | Refills: 0 | Status: SHIPPED | OUTPATIENT
Start: 2024-09-06

## 2024-09-06 NOTE — TELEPHONE ENCOUNTER
Controlled Substance Medication Arqyfm9709/06/2024 09:37 AM    This medication is a controlled substance - forward to provider to refill          LOV 1/19/24     Last Refill   amphetamine-dextroamphetamine ER 30 MG Oral Capsule SR 24 Hr 30 capsule 0 8/8/2024       No future appointments.

## 2024-09-10 DIAGNOSIS — G47.00 INSOMNIA, UNSPECIFIED TYPE: ICD-10-CM

## 2024-09-10 RX ORDER — TRAZODONE HYDROCHLORIDE 50 MG/1
50 TABLET, FILM COATED ORAL NIGHTLY PRN
Qty: 30 TABLET | Refills: 0 | Status: SHIPPED | OUTPATIENT
Start: 2024-09-10

## 2024-09-10 NOTE — TELEPHONE ENCOUNTER
Routing to provider per protocol.   traZODone 50 MG Oral Tab   Last refilled on 7/9/24 for #30  with 0 rf.   Last labs 1/19/24.   Last seen on 1/19/24.     No future appointments.       Thank you.

## 2024-09-16 DIAGNOSIS — F90.0 ATTENTION DEFICIT HYPERACTIVITY DISORDER (ADHD), PREDOMINANTLY INATTENTIVE TYPE: ICD-10-CM

## 2024-09-16 RX ORDER — DEXTROAMPHETAMINE SACCHARATE, AMPHETAMINE ASPARTATE, DEXTROAMPHETAMINE SULFATE AND AMPHETAMINE SULFATE 2.5; 2.5; 2.5; 2.5 MG/1; MG/1; MG/1; MG/1
10 TABLET ORAL DAILY PRN
Qty: 30 TABLET | Refills: 0 | Status: SHIPPED | OUTPATIENT
Start: 2024-09-16

## 2024-09-16 NOTE — TELEPHONE ENCOUNTER
Controlled Substance Medication Gkvuqo9009/16/2024 07:32 AM    This medication is a controlled substance - forward to provider to refill          LOV 1/19/24     Last Refill  mphetamine-dextroamphetamine 10 MG Oral Tab 30 tablet 0 8/16/2024       No future appointments.

## 2024-09-27 DIAGNOSIS — Z30.09 BIRTH CONTROL COUNSELING: ICD-10-CM

## 2024-09-27 RX ORDER — MEDROXYPROGESTERONE ACETATE 150 MG/ML
1 INJECTION, SUSPENSION INTRAMUSCULAR
Qty: 1 ML | Refills: 0 | Status: SHIPPED | OUTPATIENT
Start: 2024-09-27

## 2024-09-27 NOTE — TELEPHONE ENCOUNTER
LOV 1/19/24     Last Refill   Medication Quantity Refills Start End   medroxyPROGESTERone Acetate 150 MG/ML Intramuscular Suspension Prefilled Syringe 1 mL 0 7/2/2024        No future appointments.

## 2024-10-03 DIAGNOSIS — F90.0 ATTENTION DEFICIT HYPERACTIVITY DISORDER (ADHD), PREDOMINANTLY INATTENTIVE TYPE: ICD-10-CM

## 2024-10-03 DIAGNOSIS — E78.5 HYPERLIPIDEMIA, UNSPECIFIED HYPERLIPIDEMIA TYPE: ICD-10-CM

## 2024-10-03 RX ORDER — DEXTROAMPHETAMINE SACCHARATE, AMPHETAMINE ASPARTATE MONOHYDRATE, DEXTROAMPHETAMINE SULFATE AND AMPHETAMINE SULFATE 7.5; 7.5; 7.5; 7.5 MG/1; MG/1; MG/1; MG/1
30 CAPSULE, EXTENDED RELEASE ORAL EVERY MORNING
Qty: 30 CAPSULE | Refills: 0 | Status: SHIPPED | OUTPATIENT
Start: 2024-10-03

## 2024-10-03 NOTE — TELEPHONE ENCOUNTER
No refill protocol for this medication.    Last refill: 9/06/2024 #30 with 0 refills  Last Visit: 1/19/2024   Next Visit: No future appointments.      Forward to Dr. Magaña please advise on refills. Thanks.    
Script sent.   
yes

## 2024-10-04 RX ORDER — ROSUVASTATIN CALCIUM 20 MG/1
20 TABLET, COATED ORAL NIGHTLY
Qty: 90 TABLET | Refills: 0 | Status: SHIPPED | OUTPATIENT
Start: 2024-10-04

## 2024-10-04 NOTE — TELEPHONE ENCOUNTER
Cholesterol Medication Protocol Caeire01/03/2024 11:19 PM   Protocol Details ALT < 80    ALT resulted within past year    Lipid panel within past 12 months    In person appointment or virtual visit in the past 12 mos or appointment in next 3 mos          LOV 1/19/24     Last Refill   Medication Quantity Refills Start End   rosuvastatin 20 MG Oral Tab 90 tablet 0 7/18/2024        Labs 1/19/24     No future appointments.

## 2024-10-15 ENCOUNTER — PATIENT MESSAGE (OUTPATIENT)
Dept: FAMILY MEDICINE CLINIC | Facility: CLINIC | Age: 29
End: 2024-10-15

## 2024-10-15 DIAGNOSIS — F90.0 ATTENTION DEFICIT HYPERACTIVITY DISORDER (ADHD), PREDOMINANTLY INATTENTIVE TYPE: ICD-10-CM

## 2024-10-15 RX ORDER — DEXTROAMPHETAMINE SACCHARATE, AMPHETAMINE ASPARTATE, DEXTROAMPHETAMINE SULFATE AND AMPHETAMINE SULFATE 2.5; 2.5; 2.5; 2.5 MG/1; MG/1; MG/1; MG/1
10 TABLET ORAL DAILY PRN
Qty: 30 TABLET | Refills: 0 | OUTPATIENT
Start: 2024-10-15

## 2024-10-16 NOTE — TELEPHONE ENCOUNTER
Last OV:01/19/2024 physical   Last refill:09/16/2024 30 tabs, 0 refill     Medication pended, please sign if appropriate

## 2024-10-17 RX ORDER — DEXTROAMPHETAMINE SACCHARATE, AMPHETAMINE ASPARTATE, DEXTROAMPHETAMINE SULFATE AND AMPHETAMINE SULFATE 2.5; 2.5; 2.5; 2.5 MG/1; MG/1; MG/1; MG/1
10 TABLET ORAL DAILY PRN
Qty: 30 TABLET | Refills: 0 | Status: SHIPPED | OUTPATIENT
Start: 2024-10-17 | End: 2024-10-18

## 2024-10-18 DIAGNOSIS — F90.0 ATTENTION DEFICIT HYPERACTIVITY DISORDER (ADHD), PREDOMINANTLY INATTENTIVE TYPE: ICD-10-CM

## 2024-10-18 RX ORDER — DEXTROAMPHETAMINE SACCHARATE, AMPHETAMINE ASPARTATE, DEXTROAMPHETAMINE SULFATE AND AMPHETAMINE SULFATE 2.5; 2.5; 2.5; 2.5 MG/1; MG/1; MG/1; MG/1
10 TABLET ORAL DAILY PRN
Qty: 30 TABLET | Refills: 0 | Status: SHIPPED | OUTPATIENT
Start: 2024-10-18 | End: 2024-10-19

## 2024-10-18 NOTE — TELEPHONE ENCOUNTER
Patient prefers [facility] since it is closer to her house    Theresa deleted from demographics    Please resent Rx    amphetamine-dextroamphetamine 10 MG Oral Tab     Thank you

## 2024-10-19 RX ORDER — DEXTROAMPHETAMINE SACCHARATE, AMPHETAMINE ASPARTATE, DEXTROAMPHETAMINE SULFATE AND AMPHETAMINE SULFATE 2.5; 2.5; 2.5; 2.5 MG/1; MG/1; MG/1; MG/1
10 TABLET ORAL DAILY PRN
Qty: 30 TABLET | Refills: 0 | Status: SHIPPED | OUTPATIENT
Start: 2024-10-19

## 2024-10-31 DIAGNOSIS — F90.0 ATTENTION DEFICIT HYPERACTIVITY DISORDER (ADHD), PREDOMINANTLY INATTENTIVE TYPE: ICD-10-CM

## 2024-10-31 RX ORDER — DEXTROAMPHETAMINE SACCHARATE, AMPHETAMINE ASPARTATE MONOHYDRATE, DEXTROAMPHETAMINE SULFATE AND AMPHETAMINE SULFATE 7.5; 7.5; 7.5; 7.5 MG/1; MG/1; MG/1; MG/1
30 CAPSULE, EXTENDED RELEASE ORAL EVERY MORNING
Qty: 30 CAPSULE | Refills: 0 | OUTPATIENT
Start: 2024-10-31

## 2024-11-21 ENCOUNTER — PATIENT MESSAGE (OUTPATIENT)
Dept: FAMILY MEDICINE CLINIC | Facility: CLINIC | Age: 29
End: 2024-11-21

## 2024-11-21 DIAGNOSIS — F90.0 ATTENTION DEFICIT HYPERACTIVITY DISORDER (ADHD), PREDOMINANTLY INATTENTIVE TYPE: ICD-10-CM

## 2024-11-21 RX ORDER — DEXTROAMPHETAMINE SACCHARATE, AMPHETAMINE ASPARTATE, DEXTROAMPHETAMINE SULFATE AND AMPHETAMINE SULFATE 2.5; 2.5; 2.5; 2.5 MG/1; MG/1; MG/1; MG/1
10 TABLET ORAL DAILY PRN
Qty: 30 TABLET | Refills: 0 | Status: SHIPPED | OUTPATIENT
Start: 2024-11-21

## 2024-11-21 NOTE — TELEPHONE ENCOUNTER
Controlled Substance Medication Tgllpl6311/21/2024 08:59 AM    This medication is a controlled substance - forward to provider to refill       Last refill:   amphetamine-dextroamphetamine 10 MG Oral Tab 30 tablet 0 10/19/2024     Last Visit: 1/19/24   Next Visit: No future appointments.      Forward to Dr. Magaña please advise on refills. Thanks.

## 2024-12-01 DIAGNOSIS — F90.0 ATTENTION DEFICIT HYPERACTIVITY DISORDER (ADHD), PREDOMINANTLY INATTENTIVE TYPE: ICD-10-CM

## 2024-12-02 RX ORDER — DEXTROAMPHETAMINE SACCHARATE, AMPHETAMINE ASPARTATE MONOHYDRATE, DEXTROAMPHETAMINE SULFATE AND AMPHETAMINE SULFATE 7.5; 7.5; 7.5; 7.5 MG/1; MG/1; MG/1; MG/1
30 CAPSULE, EXTENDED RELEASE ORAL EVERY MORNING
Qty: 30 CAPSULE | Refills: 0 | Status: SHIPPED | OUTPATIENT
Start: 2024-12-02

## 2024-12-02 NOTE — TELEPHONE ENCOUNTER
Controlled Substance Medication Zfqidb2712/01/2024 01:03 AM    This medication is a controlled substance - forward to provider to refill       Last refill:   amphetamine-dextroamphetamine ER 30 MG Oral Capsule SR 24 Hr 30 capsule 0 11/1/2024     Last Visit: 1/19/24    Next Visit: No future appointments.      Forward to Dr. Magaña please advise on refills. Thanks.

## 2024-12-31 DIAGNOSIS — F90.0 ATTENTION DEFICIT HYPERACTIVITY DISORDER (ADHD), PREDOMINANTLY INATTENTIVE TYPE: ICD-10-CM

## 2024-12-31 NOTE — TELEPHONE ENCOUNTER
From 11/21/24 mychart:  December 30, 2024  Nakia Lang to [facility] Clinical Staff (supporting Betty Magaña DO)       12/30/24  5:50 PM  Hi! Can you please send over a refill for my 30mg XR to [facility] in [place]? Thank you!         Last OV 12/19/24  Last refilled 12/2/24  #30  0 refill

## 2025-01-02 RX ORDER — DEXTROAMPHETAMINE SACCHARATE, AMPHETAMINE ASPARTATE MONOHYDRATE, DEXTROAMPHETAMINE SULFATE AND AMPHETAMINE SULFATE 7.5; 7.5; 7.5; 7.5 MG/1; MG/1; MG/1; MG/1
30 CAPSULE, EXTENDED RELEASE ORAL EVERY MORNING
Qty: 30 CAPSULE | Refills: 0 | Status: SHIPPED | OUTPATIENT
Start: 2025-01-02

## 2025-01-06 NOTE — TELEPHONE ENCOUNTER
Pt states that all pharmacies in the area are out of medication.  She found [facility] in [place] has a limited amount.  Can you please send refill there instead asap?  Thank you!    amphetamine-dextroamphetamine ER (ADDERALL XR) 30 MG Oral Capsule SR 24 Hr [[id number]] (Order [id number])     [facility] [address] [place], [place] - [address] [phone], [phone] [[id number]]

## 2025-01-16 DIAGNOSIS — G47.00 INSOMNIA, UNSPECIFIED TYPE: ICD-10-CM

## 2025-01-16 RX ORDER — TRAZODONE HYDROCHLORIDE 50 MG/1
50 TABLET, FILM COATED ORAL NIGHTLY PRN
Qty: 30 TABLET | Refills: 0 | Status: SHIPPED | OUTPATIENT
Start: 2025-01-16

## 2025-01-18 DIAGNOSIS — F90.0 ATTENTION DEFICIT HYPERACTIVITY DISORDER (ADHD), PREDOMINANTLY INATTENTIVE TYPE: ICD-10-CM

## 2025-01-20 RX ORDER — DEXTROAMPHETAMINE SACCHARATE, AMPHETAMINE ASPARTATE, DEXTROAMPHETAMINE SULFATE AND AMPHETAMINE SULFATE 2.5; 2.5; 2.5; 2.5 MG/1; MG/1; MG/1; MG/1
10 TABLET ORAL DAILY PRN
Qty: 30 TABLET | Refills: 0 | Status: SHIPPED | OUTPATIENT
Start: 2025-01-20

## 2025-01-20 NOTE — TELEPHONE ENCOUNTER
Controlled Substance Medication Rqevew4601/18/2025 02:03 PM    This medication is a controlled substance - forward to provider to refill    Medication is active on med list       Last refill:   Medication Quantity Refills Start End   amphetamine-dextroamphetamine 10 MG Oral Tab 30 tablet 0 12/19/2024          Last Visit: 12/19/24   Next Visit: No future appointments.      Forward to Dr. Magaña please advise on refills. Thanks.

## 2025-01-31 ENCOUNTER — PATIENT MESSAGE (OUTPATIENT)
Dept: FAMILY MEDICINE CLINIC | Facility: CLINIC | Age: 30
End: 2025-01-31

## 2025-01-31 DIAGNOSIS — F90.0 ATTENTION DEFICIT HYPERACTIVITY DISORDER (ADHD), PREDOMINANTLY INATTENTIVE TYPE: ICD-10-CM

## 2025-01-31 RX ORDER — DEXTROAMPHETAMINE SACCHARATE, AMPHETAMINE ASPARTATE MONOHYDRATE, DEXTROAMPHETAMINE SULFATE AND AMPHETAMINE SULFATE 7.5; 7.5; 7.5; 7.5 MG/1; MG/1; MG/1; MG/1
30 CAPSULE, EXTENDED RELEASE ORAL EVERY MORNING
Qty: 30 CAPSULE | Refills: 0 | Status: SHIPPED | OUTPATIENT
Start: 2025-01-31 | End: 2025-03-02

## 2025-02-13 ENCOUNTER — OFFICE VISIT (OUTPATIENT)
Dept: FAMILY MEDICINE CLINIC | Facility: CLINIC | Age: 30
End: 2025-02-13
Payer: COMMERCIAL

## 2025-02-13 VITALS
TEMPERATURE: 98 F | OXYGEN SATURATION: 100 % | SYSTOLIC BLOOD PRESSURE: 106 MMHG | BODY MASS INDEX: 37.51 KG/M2 | HEIGHT: 67 IN | RESPIRATION RATE: 16 BRPM | DIASTOLIC BLOOD PRESSURE: 76 MMHG | HEART RATE: 83 BPM | WEIGHT: 239 LBS

## 2025-02-13 DIAGNOSIS — H69.91 EUSTACHIAN TUBE DYSFUNCTION, RIGHT: Primary | ICD-10-CM

## 2025-02-13 PROCEDURE — 99213 OFFICE O/P EST LOW 20 MIN: CPT | Performed by: PHYSICIAN ASSISTANT

## 2025-02-13 RX ORDER — PREDNISONE 20 MG/1
TABLET ORAL
Qty: 10 TABLET | Refills: 0 | Status: SHIPPED | OUTPATIENT
Start: 2025-02-13

## 2025-02-14 NOTE — PROGRESS NOTES
CHIEF COMPLAINT:     Chief Complaint   Patient presents with    Ear Problem     Yesterday, right side ear feels plugged and is ringing  OTC none       HPI:     Nakia Lang is a 29 year old female who presents to clinic today with complaints of a feeling of fullness in the right ear.  She noted when laying down yesterday it was ringing.  She is concerned about an ear infection.      Associated symptoms:    deniesfever  reports hearing loss  reports tinnitus.   denies dizziness or imbalance  denies otorrhea    denies nasal congestion.  denies tooth pain  denies pain with chewing at the TMJ.       Current Outpatient Medications   Medication Sig Dispense Refill    predniSONE 20 MG Oral Tab Take 2 po once daily for 5 days. 10 tablet 0    amphetamine-dextroamphetamine ER (ADDERALL XR) 30 MG Oral Capsule SR 24 Hr Take 1 capsule (30 mg total) by mouth every morning. 30 capsule 0    amphetamine-dextroamphetamine 10 MG Oral Tab Take 1 tablet (10 mg total) by mouth daily as needed (in the afternoon). 30 tablet 0    TRAZODONE 50 MG Oral Tab TAKE 1 TABLET(50 MG) BY MOUTH EVERY NIGHT AS NEEDED FOR SLEEP 30 tablet 0    rosuvastatin 20 MG Oral Tab Take 1 tablet (20 mg total) by mouth nightly. 90 tablet 0    ondansetron 4 MG Oral Tablet Dispersible Take 1 tablet (4 mg total) by mouth every 8 (eight) hours as needed. 30 tablet 0    amphetamine-dextroamphetamine ER 30 MG Oral Capsule SR 24 Hr Take 1 capsule (30 mg total) by mouth every morning. 30 capsule 0    MEDROXYPROGESTERONE ACETATE 150 MG/ML Intramuscular Suspension Prefilled Syringe ADMINISTER 1 ML IN THE MUSCLE EVERY 3 MONTHS (Patient not taking: Reported on 12/19/2024) 1 mL 0      Past Medical History:    Anxiety    Calculus of kidney    Depression    Obesity      Social History:  Social History     Socioeconomic History    Marital status:    Tobacco Use    Smoking status: Never    Smokeless tobacco: Never   Vaping Use    Vaping status: Never Used   Substance  and Sexual Activity    Alcohol use: Yes     Comment: occasionally    Drug use: No        REVIEW OF SYSTEMS:     Positive for stated complaint: ear fullness and ringing.   Pertinent positives and negatives noted in the the HPI.      EXAM:   /76   Pulse 83   Temp 97.6 °F (36.4 °C)   Resp 16   Ht 5' 7\" (1.702 m)   Wt 239 lb (108.4 kg)   LMP 07/31/2018   SpO2 100%   Breastfeeding No   BMI 37.43 kg/m²   GENERAL: WNWD NAD  SKIN: no rashes,no suspicious lesions  HEAD: atraumatic, normocephalic  EYES: conjunctiva clear, sclera non icteric  EARS: non tender with manipulation.  External auditory canals are patent.   Right TM: non erythematous  Left TM: non erythematous.  NOSE: nares patent  THROAT: mucosa moist, Posterior pharynx is not erythematous or injected. No exudates.  NECK: supple, non-tender  LUNGS: CTA without R/R/W  CARDIO: S1/S2 RRR  EXTREMITIES: no cyanosis, clubbing or edema  LYMPH: no pre or post auricular lymphadenopathy.        No results found for this or any previous visit (from the past 24 hours).      ASSESSMENT AND PLAN:   Nakia Lang is a 29 year old female who presents with:    ASSESSMENT:  Encounter Diagnosis   Name Primary?    Eustachian tube dysfunction, right Yes       PLAN: Meds as listed below.  Comfort measures as described in Patient Instructions    Meds & Refills for this Visit:  Requested Prescriptions     Signed Prescriptions Disp Refills    predniSONE 20 MG Oral Tab 10 tablet 0     Sig: Take 2 po once daily for 5 days.     Flonase, tylenol sinus.      Call or follow up with pcp if s/sx worsen, do not improve.       Patient voiced understand and is in agreement with treatment plan.

## 2025-02-15 DIAGNOSIS — G47.00 INSOMNIA, UNSPECIFIED TYPE: ICD-10-CM

## 2025-02-17 RX ORDER — TRAZODONE HYDROCHLORIDE 50 MG/1
50 TABLET ORAL NIGHTLY PRN
Qty: 30 TABLET | Refills: 0 | Status: SHIPPED | OUTPATIENT
Start: 2025-02-17

## 2025-02-19 DIAGNOSIS — F90.0 ATTENTION DEFICIT HYPERACTIVITY DISORDER (ADHD), PREDOMINANTLY INATTENTIVE TYPE: ICD-10-CM

## 2025-02-19 NOTE — TELEPHONE ENCOUNTER
Controlled Substance Medication Momwyh3702/19/2025 08:56 AM    This medication is a controlled substance - forward to provider to refill    Medication is active on med list        Last refill:   Medication Quantity Refills Start End   amphetamine-dextroamphetamine 10 MG Oral Tab 30 tablet 0 1/20/2025      Last Visit: 12/19/24    Next Visit: No future appointments.      Forward to Dr. Magaña please advise on refills. Thanks.

## 2025-02-20 RX ORDER — DEXTROAMPHETAMINE SACCHARATE, AMPHETAMINE ASPARTATE, DEXTROAMPHETAMINE SULFATE AND AMPHETAMINE SULFATE 2.5; 2.5; 2.5; 2.5 MG/1; MG/1; MG/1; MG/1
10 TABLET ORAL DAILY PRN
Qty: 30 TABLET | Refills: 0 | Status: SHIPPED | OUTPATIENT
Start: 2025-02-20

## 2025-03-01 DIAGNOSIS — F90.0 ATTENTION DEFICIT HYPERACTIVITY DISORDER (ADHD), PREDOMINANTLY INATTENTIVE TYPE: ICD-10-CM

## 2025-03-01 RX ORDER — DEXTROAMPHETAMINE SACCHARATE, AMPHETAMINE ASPARTATE MONOHYDRATE, DEXTROAMPHETAMINE SULFATE AND AMPHETAMINE SULFATE 7.5; 7.5; 7.5; 7.5 MG/1; MG/1; MG/1; MG/1
30 CAPSULE, EXTENDED RELEASE ORAL EVERY MORNING
Qty: 30 CAPSULE | Refills: 0 | OUTPATIENT
Start: 2025-03-01 | End: 2025-03-31

## 2025-03-04 ENCOUNTER — PATIENT MESSAGE (OUTPATIENT)
Dept: FAMILY MEDICINE CLINIC | Facility: CLINIC | Age: 30
End: 2025-03-04

## 2025-03-04 DIAGNOSIS — F90.0 ATTENTION DEFICIT HYPERACTIVITY DISORDER (ADHD), PREDOMINANTLY INATTENTIVE TYPE: ICD-10-CM

## 2025-03-05 RX ORDER — DEXTROAMPHETAMINE SACCHARATE, AMPHETAMINE ASPARTATE MONOHYDRATE, DEXTROAMPHETAMINE SULFATE AND AMPHETAMINE SULFATE 7.5; 7.5; 7.5; 7.5 MG/1; MG/1; MG/1; MG/1
30 CAPSULE, EXTENDED RELEASE ORAL EVERY MORNING
Qty: 30 CAPSULE | Refills: 0 | Status: SHIPPED | OUTPATIENT
Start: 2025-03-05

## 2025-03-05 RX ORDER — DEXTROAMPHETAMINE SACCHARATE, AMPHETAMINE ASPARTATE MONOHYDRATE, DEXTROAMPHETAMINE SULFATE AND AMPHETAMINE SULFATE 7.5; 7.5; 7.5; 7.5 MG/1; MG/1; MG/1; MG/1
30 CAPSULE, EXTENDED RELEASE ORAL EVERY MORNING
Qty: 30 CAPSULE | Refills: 0 | Status: SHIPPED | OUTPATIENT
Start: 2025-03-05 | End: 2025-03-05

## 2025-03-05 NOTE — TELEPHONE ENCOUNTER
[facility] does not have Adderal in stock.  Can you please send to [facility] in [place] instead?      [facility] [id number] [facility] - [place], [place] - [address] [facility] [phone], [phone]   [address] [place] [address]   Phone: [phone] Fax: [phone]   Hours: Not open 24 hours   Thank you!

## 2025-03-05 NOTE — TELEPHONE ENCOUNTER
Please let patient or care giver know or leave message that refills have been sent to [facility]. Any further issues to be addressed by Dr. Magaña  Thanks

## 2025-03-05 NOTE — TELEPHONE ENCOUNTER
Last OV:12/19/2024 ADHD  Last refill:02/20/2025 30 tabs, 0 refill     Medication pended, please sign if appropriate

## 2025-03-18 DIAGNOSIS — F90.0 ATTENTION DEFICIT HYPERACTIVITY DISORDER (ADHD), PREDOMINANTLY INATTENTIVE TYPE: ICD-10-CM

## 2025-03-18 RX ORDER — DEXTROAMPHETAMINE SACCHARATE, AMPHETAMINE ASPARTATE, DEXTROAMPHETAMINE SULFATE AND AMPHETAMINE SULFATE 2.5; 2.5; 2.5; 2.5 MG/1; MG/1; MG/1; MG/1
10 TABLET ORAL DAILY PRN
Qty: 30 TABLET | Refills: 0 | Status: SHIPPED | OUTPATIENT
Start: 2025-03-18

## 2025-03-18 NOTE — TELEPHONE ENCOUNTER
Controlled Substance Medication Mgegzo8803/18/2025 10:26 AM    This medication is a controlled substance - forward to provider to refill    Medication is active on med list          Last refill:   amphetamine-dextroamphetamine 10 MG Oral Tab 30 tablet 0 2/20/2025 --    Sig - Route: Take 1 tablet (10 mg total) by mouth daily as needed (in the afternoon). - Oral      Last Visit: 12/19/24    Next Visit: No future appointments.      Forward to Dr. Magaña please advise on refills. Thanks.

## 2025-03-19 DIAGNOSIS — G47.00 INSOMNIA, UNSPECIFIED TYPE: ICD-10-CM

## 2025-03-19 NOTE — TELEPHONE ENCOUNTER
TRAZODONE 50MG TABLETS   No refill protocol for this medication.    Last refill: 2/17/2025, for #30, 0 refill  Last Visit: 12/19/2024  Next Visit: No future appointments.      Forward to Dr. Betty Magaña, please advise on refills. Thanks.

## 2025-03-20 RX ORDER — TRAZODONE HYDROCHLORIDE 50 MG/1
50 TABLET ORAL NIGHTLY PRN
Qty: 30 TABLET | Refills: 0 | Status: SHIPPED | OUTPATIENT
Start: 2025-03-20

## 2025-04-03 ENCOUNTER — PATIENT MESSAGE (OUTPATIENT)
Dept: FAMILY MEDICINE CLINIC | Facility: CLINIC | Age: 30
End: 2025-04-03

## 2025-04-03 DIAGNOSIS — F90.0 ATTENTION DEFICIT HYPERACTIVITY DISORDER (ADHD), PREDOMINANTLY INATTENTIVE TYPE: ICD-10-CM

## 2025-04-04 RX ORDER — DEXTROAMPHETAMINE SACCHARATE, AMPHETAMINE ASPARTATE MONOHYDRATE, DEXTROAMPHETAMINE SULFATE AND AMPHETAMINE SULFATE 7.5; 7.5; 7.5; 7.5 MG/1; MG/1; MG/1; MG/1
30 CAPSULE, EXTENDED RELEASE ORAL EVERY MORNING
Qty: 30 CAPSULE | Refills: 0 | Status: SHIPPED | OUTPATIENT
Start: 2025-04-04

## 2025-04-04 NOTE — TELEPHONE ENCOUNTER
Last OV:12/19/2024  Last refill:03/05/2025 30 tabs, 0 refill     Medication pended, please sign if appropriate

## 2025-04-18 DIAGNOSIS — F90.0 ATTENTION DEFICIT HYPERACTIVITY DISORDER (ADHD), PREDOMINANTLY INATTENTIVE TYPE: ICD-10-CM

## 2025-04-18 RX ORDER — DEXTROAMPHETAMINE SACCHARATE, AMPHETAMINE ASPARTATE, DEXTROAMPHETAMINE SULFATE AND AMPHETAMINE SULFATE 2.5; 2.5; 2.5; 2.5 MG/1; MG/1; MG/1; MG/1
10 TABLET ORAL DAILY PRN
Qty: 30 TABLET | Refills: 0 | Status: SHIPPED | OUTPATIENT
Start: 2025-04-18

## 2025-04-18 NOTE — TELEPHONE ENCOUNTER
Controlled Substance Medication Gdatfn9204/18/2025 12:39 PM    This medication is a controlled substance - forward to provider to refill    Medication is active on med list      Routing to provider per protocol.   amphetamine-dextroamphetamine 10 MG Oral Tab   Last refilled on 3/18/25 for #30  with 0 rf.   Last labs 4/4/24.   Last seen on 12/19/24.     No future appointments.       Thank you.

## 2025-04-30 DIAGNOSIS — G47.00 INSOMNIA, UNSPECIFIED TYPE: ICD-10-CM

## 2025-04-30 NOTE — TELEPHONE ENCOUNTER
Routing to provider per protocol.   TRAZODONE 50 MG Oral Tab   Last refilled on 3/20/25 for #30  with 0 rf.   Last labs 4/4/24.   Last seen on 12/19/24.     No future appointments.       Thank you.

## 2025-05-01 RX ORDER — TRAZODONE HYDROCHLORIDE 50 MG/1
50 TABLET ORAL NIGHTLY PRN
Qty: 30 TABLET | Refills: 0 | Status: SHIPPED | OUTPATIENT
Start: 2025-05-01

## 2025-05-02 DIAGNOSIS — F90.0 ATTENTION DEFICIT HYPERACTIVITY DISORDER (ADHD), PREDOMINANTLY INATTENTIVE TYPE: ICD-10-CM

## 2025-05-02 RX ORDER — DEXTROAMPHETAMINE SACCHARATE, AMPHETAMINE ASPARTATE MONOHYDRATE, DEXTROAMPHETAMINE SULFATE AND AMPHETAMINE SULFATE 7.5; 7.5; 7.5; 7.5 MG/1; MG/1; MG/1; MG/1
30 CAPSULE, EXTENDED RELEASE ORAL EVERY MORNING
Qty: 30 CAPSULE | Refills: 0 | Status: SHIPPED | OUTPATIENT
Start: 2025-05-02

## 2025-05-02 NOTE — TELEPHONE ENCOUNTER
Patient calling to request refill of amphetamine-dextroamphetamine ER 30 MG Oral Capsule SR 24 Hr   [id number] - [place], [place] - [address] AT [facility] [id number] [phone], [phone]

## 2025-05-19 DIAGNOSIS — F90.0 ATTENTION DEFICIT HYPERACTIVITY DISORDER (ADHD), PREDOMINANTLY INATTENTIVE TYPE: ICD-10-CM

## 2025-05-19 RX ORDER — DEXTROAMPHETAMINE SACCHARATE, AMPHETAMINE ASPARTATE, DEXTROAMPHETAMINE SULFATE AND AMPHETAMINE SULFATE 2.5; 2.5; 2.5; 2.5 MG/1; MG/1; MG/1; MG/1
10 TABLET ORAL DAILY PRN
Qty: 30 TABLET | Refills: 0 | Status: SHIPPED | OUTPATIENT
Start: 2025-05-19

## 2025-05-27 DIAGNOSIS — E78.5 HYPERLIPIDEMIA, UNSPECIFIED HYPERLIPIDEMIA TYPE: ICD-10-CM

## 2025-05-28 RX ORDER — ROSUVASTATIN CALCIUM 20 MG/1
20 TABLET, COATED ORAL NIGHTLY
Qty: 90 TABLET | Refills: 0 | Status: SHIPPED | OUTPATIENT
Start: 2025-05-28

## 2025-05-28 NOTE — TELEPHONE ENCOUNTER
Cholesterol Medication Protocol Faxwyu8405/27/2025 11:12 PM   Protocol Details ALT < 80    ALT resulted within past year    Lipid panel within past 12 months    In person appointment or virtual visit in the past 12 mos or appointment in next 3 mos    Medication is active on med list     rosuvastatin 20 MG Oral Tab for 90 tabs with 0 refills. Started on 10/4/24    Last labs: 1/19/24  Last OV: 12/19/24

## 2025-06-02 DIAGNOSIS — F90.0 ATTENTION DEFICIT HYPERACTIVITY DISORDER (ADHD), PREDOMINANTLY INATTENTIVE TYPE: ICD-10-CM

## 2025-06-02 RX ORDER — DEXTROAMPHETAMINE SACCHARATE, AMPHETAMINE ASPARTATE MONOHYDRATE, DEXTROAMPHETAMINE SULFATE AND AMPHETAMINE SULFATE 7.5; 7.5; 7.5; 7.5 MG/1; MG/1; MG/1; MG/1
30 CAPSULE, EXTENDED RELEASE ORAL EVERY MORNING
Qty: 30 CAPSULE | Refills: 0 | Status: SHIPPED | OUTPATIENT
Start: 2025-06-02

## 2025-06-02 NOTE — TELEPHONE ENCOUNTER
Controlled Substance Medication Wxjceo4306/02/2025 10:06 AM    This medication is a controlled substance - forward to provider to refill    Medication is active on med list       Last refill:   amphetamine-dextroamphetamine ER 30 MG Oral Capsule SR 24 Hr 30 capsule 0 5/2/2025     Last Visit: 12/19/24    Next Visit: No future appointments.      Forward to Dr. Magaña please advise on refills. Thanks.

## 2025-06-18 DIAGNOSIS — F90.0 ATTENTION DEFICIT HYPERACTIVITY DISORDER (ADHD), PREDOMINANTLY INATTENTIVE TYPE: ICD-10-CM

## 2025-06-19 RX ORDER — DEXTROAMPHETAMINE SACCHARATE, AMPHETAMINE ASPARTATE, DEXTROAMPHETAMINE SULFATE AND AMPHETAMINE SULFATE 2.5; 2.5; 2.5; 2.5 MG/1; MG/1; MG/1; MG/1
10 TABLET ORAL DAILY PRN
Qty: 30 TABLET | Refills: 0 | Status: SHIPPED | OUTPATIENT
Start: 2025-06-19

## 2025-06-19 NOTE — TELEPHONE ENCOUNTER
Controlled Substance Medication Uvneok5806/18/2025 11:13 PM    This medication is a controlled substance - forward to provider to refill    Medication is active on med list        Last refill:   amphetamine-dextroamphetamine ER 30 MG Oral Capsule SR 24 Hr 30 capsule 0 6/2/2025     Last Visit: 12/9/24    Next Visit: No future appointments.      Forward to Dr. Magaña please advise on refills. Thanks.

## 2025-08-28 ENCOUNTER — OFFICE VISIT (OUTPATIENT)
Dept: FAMILY MEDICINE CLINIC | Facility: CLINIC | Age: 30
End: 2025-08-28

## 2025-08-28 VITALS
DIASTOLIC BLOOD PRESSURE: 70 MMHG | RESPIRATION RATE: 18 BRPM | TEMPERATURE: 97 F | SYSTOLIC BLOOD PRESSURE: 122 MMHG | OXYGEN SATURATION: 98 % | WEIGHT: 259 LBS | HEART RATE: 76 BPM | BODY MASS INDEX: 41 KG/M2

## 2025-08-28 DIAGNOSIS — H65.91 RIGHT NON-SUPPURATIVE OTITIS MEDIA: Primary | ICD-10-CM

## 2025-08-28 PROCEDURE — 99214 OFFICE O/P EST MOD 30 MIN: CPT | Performed by: FAMILY MEDICINE

## 2025-08-28 RX ORDER — AMOXICILLIN 875 MG/1
875 TABLET, COATED ORAL 2 TIMES DAILY
Qty: 20 TABLET | Refills: 0 | Status: SHIPPED | OUTPATIENT
Start: 2025-08-28 | End: 2025-09-07

## (undated) DIAGNOSIS — F90.0 ATTENTION DEFICIT HYPERACTIVITY DISORDER (ADHD), PREDOMINANTLY INATTENTIVE TYPE: ICD-10-CM

## (undated) NOTE — MR AVS SNAPSHOT
[address] [facility]  [place] [address]  [phone]               Thank you for choosing us for your health care visit with Lawrence Fields PA-C.   We are glad to serve you and happy to provide you with of your cough is not certain. Below are some common causes for persistent cough. Smokers cough  Smoker’s cough doesn’t go away. If you continue to smoke, it only gets worse. The cough is from irritation in the air passages.  Talk to your healthcare provide chest, belching, or cough. Symptoms are often worse when lying flat. Avoid eating or drinking before bedtime. Try using extra pillows to raise your upper body, or place 4-inch blocks under the head of your bed.  You may try an over-the-counter antacid or an or Shortness of Breath. What changed:  Another medication with the same name was added. Make sure you understand how and when to take each.            * Albuterol Sulfate 108 (90 Base) MCG/ACT Aepb   Inhale 2 puffs into the lungs every 4 to 6 hours as nee Sign up for [employer], your secure online medical record. [employer] will allow you to access patient instructions from your recent visit,  view other health information, and more. To sign up or find more information, go to [email or web address] and cl

## (undated) NOTE — LETTER
Nakia Lang   [address] [place] [address]           Dear Nakia Lang     Our records indicate that you have outstanding lab work and or testing that was ordered for you and has not yet been completed:  Lab Frequency Next Occurrence   Lipid Panel [E] Once 04/20/2024   Comp Metabolic Panel (14) [E] Once 04/20/2024      To provide you with the best possible care, please complete these orders at your earliest convenience. If you have recently completed these orders please disregard this letter.     If you have any questions please call the office at [phone].     Thank you,     [facility]

## (undated) NOTE — Clinical Note
[facility], [facility], [address] [place] [id number]  [phone]        6/6/2017        [address] [place] [address]      Dear Aziza Moon,      To help us provide the

## (undated) NOTE — LETTER
08/11/17        [address] [place] [address]      Dear Melida Bustos,    [facility] indicate that you have outstanding lab work and or testing that was ordered for you and has not yet been completed:  Lab Frequency Next Occurrence   LIPID

## (undated) NOTE — LETTER
[facility]   [address]           Dear Genoveva Carson records indicate that you have outstanding lab work and or testing that was ordered for you and has not yet been completed:  Lab Frequency Next Occurrence   CBC WITH DIFFERENTIAL WITH PLATELET Once 44/85/7050   COMP METABOLIC PANEL (14) Once 20/00/4848   HEMOGLOBIN A1C Once 11/21/2022   LIPID PANEL Once 11/21/2022   TSH W REFLEX TO FREE T4 Once 11/21/2022      To provide you with the best possible care, please complete these orders at your earliest convenience. If you have recently completed these orders please disregard this letter. If you have any questions please call the office at [phone].      Thank you,     [facility]

## (undated) NOTE — MR AVS SNAPSHOT
[address] [facility]  [place]  [address]  [phone]               Thank you for choosing us for your health care visit with Lindsey Michaels PA-C.   We are glad to serve you and happy to provide you with Drink fluids  Drinking extra fluids helps thin your mucus. This lets it drain from your sinuses more easily. Have a glass of water every hour or two. A humidifier helps in much the same way. Fluids can also offset the drying effects of certain medicines.  I you’ve had TMD, it’s important to avoid reinjury. Get in the habit of doing self-checks. This can make you aware of any symptoms that begin to return, so you can take action right away.     Doing self-checks  Make it a habit to assess your body a few times faster and make reinjury less likely. Here are some tips to get you started:  · Talk to your health care provider before starting an exercise program.  · Always warm up and stretch before each activity. This helps prevent injury.   · Try walking or swimming Commonly known as:  FLONASE           * Fluticasone Propionate 50 MCG/ACT Susp   2 sprays by Each Nare route daily. What changed: You were already taking a medication with the same name, and this prescription was added.  Make sure you understand how and

## (undated) NOTE — LETTER
Date: 11/15/2023    Patient Name: Aracelis Treviño          To Whom it may concern: This letter has been written at the patient's request. The above patient was seen at the [facility] for treatment of a medical condition. Please excuse her absence.          Sincerely,    YAYA Ortiz

## (undated) NOTE — MR AVS SNAPSHOT
[address] [facility]  [address] [facility] [address] [phone]               Thank you for choosing us for your health care visit with Sheridan Weaver PA-C.   We are glad to serve you and happy to provide you with Inhale 2 puffs into the lungs every 4 (four) hours as needed for Wheezing or Shortness of Breath. What changed:  reasons to take this           azithromycin 250 MG Tabs   Take two tablets by mouth today, then one tablet daily.    Commonly known as:  [employer] not sign up before the expiration date, you must request a new code. Your unique [employer] Access Code: Paul Parks  Expires: 3/10/2017  5:50 PM    If you have questions, you can call [phone] to talk to our [facility] Staff.  Remember, [employer]